# Patient Record
Sex: FEMALE | Race: WHITE | NOT HISPANIC OR LATINO | URBAN - METROPOLITAN AREA
[De-identification: names, ages, dates, MRNs, and addresses within clinical notes are randomized per-mention and may not be internally consistent; named-entity substitution may affect disease eponyms.]

---

## 2018-03-26 ENCOUNTER — INPATIENT (INPATIENT)
Facility: HOSPITAL | Age: 83
LOS: 3 days | Discharge: NURSING, REHAB OR LONG-TERM CARE | DRG: 689 | End: 2018-03-30
Attending: INTERNAL MEDICINE | Admitting: HOSPITALIST
Payer: MEDICARE

## 2018-03-26 VITALS
SYSTOLIC BLOOD PRESSURE: 141 MMHG | WEIGHT: 169.98 LBS | RESPIRATION RATE: 18 BRPM | HEART RATE: 67 BPM | OXYGEN SATURATION: 97 % | DIASTOLIC BLOOD PRESSURE: 95 MMHG | HEIGHT: 68 IN

## 2018-03-26 PROCEDURE — 93010 ELECTROCARDIOGRAM REPORT: CPT

## 2018-03-26 PROCEDURE — 99285 EMERGENCY DEPT VISIT HI MDM: CPT

## 2018-03-26 NOTE — ED PROVIDER NOTE - OBJECTIVE STATEMENT
88 y/o female presents to the ED via [facility] for evaluation of AMS. EMS states per [facility] pt normally ambulates with a walker and is verbal. Minimally verbal in the ED. Unable to obtain HPI secondary to AMS.

## 2018-03-27 DIAGNOSIS — N30.00 ACUTE CYSTITIS WITHOUT HEMATURIA: ICD-10-CM

## 2018-03-27 DIAGNOSIS — G30.1 ALZHEIMER'S DISEASE WITH LATE ONSET: ICD-10-CM

## 2018-03-27 DIAGNOSIS — R41.82 ALTERED MENTAL STATUS, UNSPECIFIED: ICD-10-CM

## 2018-03-27 DIAGNOSIS — I72.9 ANEURYSM OF UNSPECIFIED SITE: ICD-10-CM

## 2018-03-27 DIAGNOSIS — I10 ESSENTIAL (PRIMARY) HYPERTENSION: ICD-10-CM

## 2018-03-27 DIAGNOSIS — G93.40 ENCEPHALOPATHY, UNSPECIFIED: ICD-10-CM

## 2018-03-27 LAB
ALBUMIN SERPL ELPH-MCNC: 4.4 G/DL — SIGNIFICANT CHANGE UP (ref 3.3–5.2)
ALLERGY+IMMUNOLOGY DIAG STUDY NOTE: SIGNIFICANT CHANGE UP
ALP SERPL-CCNC: 80 U/L — SIGNIFICANT CHANGE UP (ref 40–120)
ALT FLD-CCNC: 21 U/L — SIGNIFICANT CHANGE UP
ANION GAP SERPL CALC-SCNC: 13 MMOL/L — SIGNIFICANT CHANGE UP (ref 5–17)
APAP SERPL-MCNC: <7.5 UG/ML — LOW (ref 10–26)
APPEARANCE UR: CLEAR — SIGNIFICANT CHANGE UP
APTT BLD: 38.7 SEC — HIGH (ref 27.5–37.4)
AST SERPL-CCNC: 34 U/L — HIGH
BACTERIA # UR AUTO: ABNORMAL
BASOPHILS # BLD AUTO: 0 K/UL — SIGNIFICANT CHANGE UP (ref 0–0.2)
BASOPHILS NFR BLD AUTO: 0.5 % — SIGNIFICANT CHANGE UP (ref 0–2)
BILIRUB SERPL-MCNC: 0.7 MG/DL — SIGNIFICANT CHANGE UP (ref 0.4–2)
BILIRUB UR-MCNC: ABNORMAL
BLD GP AB SCN SERPL QL: ABNORMAL
BUN SERPL-MCNC: 25 MG/DL — HIGH (ref 8–20)
CALCIUM SERPL-MCNC: 9.7 MG/DL — SIGNIFICANT CHANGE UP (ref 8.6–10.2)
CHLORIDE SERPL-SCNC: 101 MMOL/L — SIGNIFICANT CHANGE UP (ref 98–107)
CK SERPL-CCNC: 53 U/L — SIGNIFICANT CHANGE UP (ref 25–170)
CO2 SERPL-SCNC: 26 MMOL/L — SIGNIFICANT CHANGE UP (ref 22–29)
COLOR SPEC: YELLOW — SIGNIFICANT CHANGE UP
CREAT SERPL-MCNC: 1.09 MG/DL — SIGNIFICANT CHANGE UP (ref 0.5–1.3)
DIFF PNL FLD: ABNORMAL
DIR ANTIGLOB POLYSPECIFIC INTERPRETATION: SIGNIFICANT CHANGE UP
EOSINOPHIL # BLD AUTO: 0.2 K/UL — SIGNIFICANT CHANGE UP (ref 0–0.5)
EOSINOPHIL NFR BLD AUTO: 3.2 % — SIGNIFICANT CHANGE UP (ref 0–6)
EPI CELLS # UR: SIGNIFICANT CHANGE UP
GLUCOSE SERPL-MCNC: 101 MG/DL — SIGNIFICANT CHANGE UP (ref 70–115)
GLUCOSE UR QL: NEGATIVE MG/DL — SIGNIFICANT CHANGE UP
HBA1C BLD-MCNC: 5.4 % — SIGNIFICANT CHANGE UP (ref 4–5.6)
HCT VFR BLD CALC: 41.4 % — SIGNIFICANT CHANGE UP (ref 37–47)
HGB BLD-MCNC: 13.2 G/DL — SIGNIFICANT CHANGE UP (ref 12–16)
INR BLD: 1.06 RATIO — SIGNIFICANT CHANGE UP (ref 0.88–1.16)
KETONES UR-MCNC: ABNORMAL
LEUKOCYTE ESTERASE UR-ACNC: ABNORMAL
LIDOCAIN IGE QN: 7 U/L — LOW (ref 22–51)
LYMPHOCYTES # BLD AUTO: 1.3 K/UL — SIGNIFICANT CHANGE UP (ref 1–4.8)
LYMPHOCYTES # BLD AUTO: 19.8 % — LOW (ref 20–55)
MAGNESIUM SERPL-MCNC: 1.8 MG/DL — SIGNIFICANT CHANGE UP (ref 1.6–2.6)
MCHC RBC-ENTMCNC: 28.5 PG — SIGNIFICANT CHANGE UP (ref 27–31)
MCHC RBC-ENTMCNC: 31.9 G/DL — LOW (ref 32–36)
MCV RBC AUTO: 89.4 FL — SIGNIFICANT CHANGE UP (ref 81–99)
MONOCYTES # BLD AUTO: 0.9 K/UL — HIGH (ref 0–0.8)
MONOCYTES NFR BLD AUTO: 13.1 % — HIGH (ref 3–10)
NEUTROPHILS # BLD AUTO: 4.1 K/UL — SIGNIFICANT CHANGE UP (ref 1.8–8)
NEUTROPHILS NFR BLD AUTO: 62 % — SIGNIFICANT CHANGE UP (ref 37–73)
NITRITE UR-MCNC: NEGATIVE — SIGNIFICANT CHANGE UP
PH UR: 5 — SIGNIFICANT CHANGE UP (ref 5–8)
PHOSPHATE SERPL-MCNC: 4.2 MG/DL — SIGNIFICANT CHANGE UP (ref 2.4–4.7)
PLATELET # BLD AUTO: 169 K/UL — SIGNIFICANT CHANGE UP (ref 150–400)
POTASSIUM SERPL-MCNC: 4.8 MMOL/L — SIGNIFICANT CHANGE UP (ref 3.5–5.3)
POTASSIUM SERPL-SCNC: 4.8 MMOL/L — SIGNIFICANT CHANGE UP (ref 3.5–5.3)
PROT SERPL-MCNC: 7.4 G/DL — SIGNIFICANT CHANGE UP (ref 6.6–8.7)
PROT UR-MCNC: 30 MG/DL
PROTHROM AB SERPL-ACNC: 11.7 SEC — SIGNIFICANT CHANGE UP (ref 9.8–12.7)
RBC # BLD: 4.63 M/UL — SIGNIFICANT CHANGE UP (ref 4.4–5.2)
RBC # FLD: 15.6 % — SIGNIFICANT CHANGE UP (ref 11–15.6)
RBC CASTS # UR COMP ASSIST: ABNORMAL /HPF (ref 0–4)
SALICYLATES SERPL-MCNC: 0.6 MG/DL — LOW (ref 10–20)
SODIUM SERPL-SCNC: 140 MMOL/L — SIGNIFICANT CHANGE UP (ref 135–145)
SP GR SPEC: 1.02 — SIGNIFICANT CHANGE UP (ref 1.01–1.02)
TSH SERPL-MCNC: 3.12 UIU/ML — SIGNIFICANT CHANGE UP (ref 0.27–4.2)
TYPE + AB SCN PNL BLD: SIGNIFICANT CHANGE UP
UROBILINOGEN FLD QL: 4 MG/DL
WBC # BLD: 6.6 K/UL — SIGNIFICANT CHANGE UP (ref 4.8–10.8)
WBC # FLD AUTO: 6.6 K/UL — SIGNIFICANT CHANGE UP (ref 4.8–10.8)
WBC UR QL: ABNORMAL

## 2018-03-27 PROCEDURE — 71045 X-RAY EXAM CHEST 1 VIEW: CPT | Mod: 26

## 2018-03-27 PROCEDURE — 86077 PHYS BLOOD BANK SERV XMATCH: CPT

## 2018-03-27 PROCEDURE — 12345: CPT | Mod: NC

## 2018-03-27 PROCEDURE — 93306 TTE W/DOPPLER COMPLETE: CPT | Mod: 26

## 2018-03-27 PROCEDURE — 99223 1ST HOSP IP/OBS HIGH 75: CPT

## 2018-03-27 PROCEDURE — 70450 CT HEAD/BRAIN W/O DYE: CPT | Mod: 26

## 2018-03-27 RX ORDER — SENNA PLUS 8.6 MG/1
2 TABLET ORAL AT BEDTIME
Qty: 0 | Refills: 0 | Status: DISCONTINUED | OUTPATIENT
Start: 2018-03-27 | End: 2018-03-30

## 2018-03-27 RX ORDER — HYDRALAZINE HCL 50 MG
10 TABLET ORAL EVERY 6 HOURS
Qty: 0 | Refills: 0 | Status: DISCONTINUED | OUTPATIENT
Start: 2018-03-27 | End: 2018-03-30

## 2018-03-27 RX ORDER — DOCUSATE SODIUM 100 MG
100 CAPSULE ORAL THREE TIMES A DAY
Qty: 0 | Refills: 0 | Status: DISCONTINUED | OUTPATIENT
Start: 2018-03-27 | End: 2018-03-30

## 2018-03-27 RX ORDER — CEFTRIAXONE 500 MG/1
1000 INJECTION, POWDER, FOR SOLUTION INTRAMUSCULAR; INTRAVENOUS ONCE
Qty: 0 | Refills: 0 | Status: COMPLETED | OUTPATIENT
Start: 2018-03-27 | End: 2018-03-27

## 2018-03-27 RX ORDER — CEFTRIAXONE 500 MG/1
1 INJECTION, POWDER, FOR SOLUTION INTRAMUSCULAR; INTRAVENOUS ONCE
Qty: 0 | Refills: 0 | Status: DISCONTINUED | OUTPATIENT
Start: 2018-03-27 | End: 2018-03-27

## 2018-03-27 RX ORDER — METOPROLOL TARTRATE 50 MG
25 TABLET ORAL
Qty: 0 | Refills: 0 | Status: DISCONTINUED | OUTPATIENT
Start: 2018-03-27 | End: 2018-03-28

## 2018-03-27 RX ORDER — ASPIRIN/CALCIUM CARB/MAGNESIUM 324 MG
81 TABLET ORAL DAILY
Qty: 0 | Refills: 0 | Status: DISCONTINUED | OUTPATIENT
Start: 2018-03-27 | End: 2018-03-30

## 2018-03-27 RX ORDER — VALPROIC ACID (AS SODIUM SALT) 250 MG/5ML
250 SOLUTION, ORAL ORAL THREE TIMES A DAY
Qty: 0 | Refills: 0 | Status: DISCONTINUED | OUTPATIENT
Start: 2018-03-27 | End: 2018-03-27

## 2018-03-27 RX ORDER — ENOXAPARIN SODIUM 100 MG/ML
40 INJECTION SUBCUTANEOUS DAILY
Qty: 0 | Refills: 0 | Status: DISCONTINUED | OUTPATIENT
Start: 2018-03-27 | End: 2018-03-28

## 2018-03-27 RX ORDER — FLUOXETINE HCL 10 MG
40 CAPSULE ORAL DAILY
Qty: 0 | Refills: 0 | Status: DISCONTINUED | OUTPATIENT
Start: 2018-03-27 | End: 2018-03-30

## 2018-03-27 RX ORDER — CEFTRIAXONE 500 MG/1
1 INJECTION, POWDER, FOR SOLUTION INTRAMUSCULAR; INTRAVENOUS EVERY 24 HOURS
Qty: 0 | Refills: 0 | Status: DISCONTINUED | OUTPATIENT
Start: 2018-03-27 | End: 2018-03-27

## 2018-03-27 RX ORDER — SODIUM CHLORIDE 9 MG/ML
1000 INJECTION INTRAMUSCULAR; INTRAVENOUS; SUBCUTANEOUS ONCE
Qty: 0 | Refills: 0 | Status: COMPLETED | OUTPATIENT
Start: 2018-03-27 | End: 2018-03-27

## 2018-03-27 RX ORDER — CEFTRIAXONE 500 MG/1
1000 INJECTION, POWDER, FOR SOLUTION INTRAMUSCULAR; INTRAVENOUS EVERY 24 HOURS
Qty: 0 | Refills: 0 | Status: DISCONTINUED | OUTPATIENT
Start: 2018-03-27 | End: 2018-03-30

## 2018-03-27 RX ORDER — THIAMINE MONONITRATE (VIT B1) 100 MG
100 TABLET ORAL DAILY
Qty: 0 | Refills: 0 | Status: DISCONTINUED | OUTPATIENT
Start: 2018-03-27 | End: 2018-03-30

## 2018-03-27 RX ORDER — ASPIRIN/CALCIUM CARB/MAGNESIUM 324 MG
325 TABLET ORAL DAILY
Qty: 0 | Refills: 0 | Status: DISCONTINUED | OUTPATIENT
Start: 2018-03-27 | End: 2018-03-27

## 2018-03-27 RX ORDER — FOLIC ACID 0.8 MG
1 TABLET ORAL DAILY
Qty: 0 | Refills: 0 | Status: DISCONTINUED | OUTPATIENT
Start: 2018-03-27 | End: 2018-03-30

## 2018-03-27 RX ADMIN — Medication 10 MILLIGRAM(S): at 08:21

## 2018-03-27 RX ADMIN — Medication 10 MILLIGRAM(S): at 18:47

## 2018-03-27 RX ADMIN — Medication 81 MILLIGRAM(S): at 11:54

## 2018-03-27 RX ADMIN — Medication 100 MILLIGRAM(S): at 11:54

## 2018-03-27 RX ADMIN — Medication 40 MILLIGRAM(S): at 11:55

## 2018-03-27 RX ADMIN — ENOXAPARIN SODIUM 40 MILLIGRAM(S): 100 INJECTION SUBCUTANEOUS at 12:02

## 2018-03-27 RX ADMIN — Medication 1 MILLIGRAM(S): at 12:03

## 2018-03-27 RX ADMIN — CEFTRIAXONE 1000 MILLIGRAM(S): 500 INJECTION, POWDER, FOR SOLUTION INTRAMUSCULAR; INTRAVENOUS at 03:58

## 2018-03-27 RX ADMIN — Medication 25 MILLIGRAM(S): at 06:20

## 2018-03-27 RX ADMIN — SODIUM CHLORIDE 2000 MILLILITER(S): 9 INJECTION INTRAMUSCULAR; INTRAVENOUS; SUBCUTANEOUS at 03:57

## 2018-03-27 NOTE — CONSULT NOTE ADULT - SUBJECTIVE AND OBJECTIVE BOX
CHIEF COMPLAINT:    HPI: 89yFemale  89 y/o female came to the ED from [facility] for evaluation of AMS. As per EMS and per [facility] pt normally ambulates with a walker and is verbal. pt. is not answering any questions in the ER. Unable to obtain HPI as pt. not communicating. Clinically pt. resting in bed, awake, has eye contact but not giving any history or complaining of anything. As per [facility] pt. is not on valproic acid anymore. she is on fluoxetine now.     PAST MEDICAL & SURGICAL HISTORY:  Depression  HTN (hypertension)  Aneurysm  No significant past surgical history    MEDICATIONS  (STANDING):  aspirin enteric coated 81 milliGRAM(s) Oral daily  cefTRIAXone Injectable. 1000 milliGRAM(s) IV Push every 24 hours  enoxaparin Injectable 40 milliGRAM(s) SubCutaneous daily  FLUoxetine Solution 40 milliGRAM(s) Oral daily  folic acid 1 milliGRAM(s) Oral daily  metoprolol tartrate 25 milliGRAM(s) Oral two times a day  thiamine 100 milliGRAM(s) Oral daily    MEDICATIONS  (PRN):  docusate sodium 100 milliGRAM(s) Oral three times a day PRN Constipation  hydrALAZINE Injectable 10 milliGRAM(s) IV Push every 6 hours PRN systolic bp greater than 160 mm/hg  senna 2 Tablet(s) Oral at bedtime PRN Constipation    Allergies    No Known Allergies    Intolerances        FAMILY HISTORY:  No pertinent family history in first degree relatives          SOCIAL HISTORY:    Tobacco:  no  Alcohol:  no  Drugs:  no        REVIEW OF SYSTEMS:    Relevant systems are negative except as noted in the chart, HPI, and PMH      VITAL SIGNS:  Vital Signs Last 24 Hrs  T(C): 36.3 (27 Mar 2018 10:07), Max: 36.7 (27 Mar 2018 05:45)  T(F): 97.4 (27 Mar 2018 10:07), Max: 98.1 (27 Mar 2018 05:45)  HR: 65 (27 Mar 2018 10:07) (60 - 68)  BP: 142/63 (27 Mar 2018 10:07) (141/95 - 172/107)  BP(mean): --  RR: 18 (27 Mar 2018 10:07) (18 - 20)  SpO2: 96% (27 Mar 2018 08:22) (94% - 97%)    PHYSICAL EXAMINATION:    General: Well-developed, well nourished, in no acute distress.  Cardiac:  Regular rate and rhythm. No carotid bruits appreciated.  Eyes: Fundoscopic examination was deferred.  Neurologic:  - Mental Status:  Alert, awake, oriented to person, place, and time; Speech is fluent. Language is normal. Follows commands well.  Insight and knowledge appear appropriate.  Cranial Nerves II-XII:    II:  Visual acuity is normal for age ; Visual fields are full to confrontation; Pupils are equal, round, and reactive to light.  III, IV, VI:  Extraocular movements are intact without nystagmus.  V:  Facial sensation is intact in the V1-V3 distribution bilaterally.  VII:  Face is symmetric with normal eye closure and smile  VIII:  Hearing is grossly intact  IX, X, XII:  speech is clear  XI:  Head turning and shoulder shrug are intact.  - Motor:  Strength is 5/5 x 4.   There is no pronator drift. .  - Reflexes:  2+ and symmetric at the knees.  Plantar responses flexor.  - Sensory:  Symmetric to light touch  - Coordination:  Finger-nose-finger is normal. Rapid alternating hand and foot  movements are intact. Dexterity appears normal      LABS:                          13.2   6.6   )-----------( 169      ( 27 Mar 2018 00:52 )             41.4     27 Mar 2018 00:52    140    |  101    |  25.0   ----------------------------<  101    4.8     |  26.0   |  1.09     Ca    9.7        27 Mar 2018 00:52  Phos  4.2       27 Mar 2018 00:52  Mg     1.8       27 Mar 2018 00:52    TPro  7.4    /  Alb  4.4    /  TBili  0.7    /  DBili  x      /  AST  34     /  ALT  21     /  AlkPhos  80     27 Mar 2018 00:52    LIVER FUNCTIONS - ( 27 Mar 2018 00:52 )  Alb: 4.4 g/dL / Pro: 7.4 g/dL / ALK PHOS: 80 U/L / ALT: 21 U/L / AST: 34 U/L / GGT: x           PT/INR - ( 27 Mar 2018 00:52 )   PT: 11.7 sec;   INR: 1.06 ratio         PTT - ( 27 Mar 2018 00:52 )  PTT:38.7 sec      RADIOLOGY & ADDITIONAL STUDIES:    < from: CT Head No Cont (03.27.18 @ 00:42) >  No acute intracranial hemorrhage,large cortical infarct or mass effect.   Small lucency in the ventral midline medulla, which may be due to   artifact although additional differential diagnosis includes syrinx and   age-indeterminate infarct. A 0.6 x 0.5 cm round density just anterior to   the medulla and just left lateral to the left vertebral artery, which may   represent a focal aneurysm.    < end of copied text >      IMPRESSION:      PLAN:  1.   2.   3.   4.  5. CHIEF COMPLAINT:    HPI: 89yFemale  88 y/o female came to the ED from [facility] for evaluation of AMS. As per EMS and per [facility] pt normally ambulates with a walker and is verbal. pt. is not answering any questions in the ER. Unable to obtain HPI as pt. not communicating. Clinically pt. resting in bed, awake, has eye contact but not giving any history or complaining of anything. As per [facility] pt. is not on valproic acid anymore. she is on fluoxetine now.     This morning she is back to herself. Discussed with daughter     PAST MEDICAL & SURGICAL HISTORY:  Depression  HTN (hypertension)  Aneurysm-abdo  No significant past surgical history    MEDICATIONS  (STANDING):  aspirin enteric coated 81 milliGRAM(s) Oral daily  cefTRIAXone Injectable. 1000 milliGRAM(s) IV Push every 24 hours  enoxaparin Injectable 40 milliGRAM(s) SubCutaneous daily  FLUoxetine Solution 40 milliGRAM(s) Oral daily  folic acid 1 milliGRAM(s) Oral daily  metoprolol tartrate 25 milliGRAM(s) Oral two times a day  thiamine 100 milliGRAM(s) Oral daily    MEDICATIONS  (PRN):  docusate sodium 100 milliGRAM(s) Oral three times a day PRN Constipation  hydrALAZINE Injectable 10 milliGRAM(s) IV Push every 6 hours PRN systolic bp greater than 160 mm/hg  senna 2 Tablet(s) Oral at bedtime PRN Constipation    Allergies    No Known Allergies    Intolerances        FAMILY HISTORY:  No pertinent family history in first degree relatives          SOCIAL HISTORY:    Tobacco:  no  Alcohol:  no  Drugs:  no        REVIEW OF SYSTEMS:    Relevant systems are negative except as noted in the chart, HPI, and PMH      VITAL SIGNS:  Vital Signs Last 24 Hrs  T(C): 36.3 (27 Mar 2018 10:07), Max: 36.7 (27 Mar 2018 05:45)  T(F): 97.4 (27 Mar 2018 10:07), Max: 98.1 (27 Mar 2018 05:45)  HR: 65 (27 Mar 2018 10:07) (60 - 68)  BP: 142/63 (27 Mar 2018 10:07) (141/95 - 172/107)  BP(mean): --  RR: 18 (27 Mar 2018 10:07) (18 - 20)  SpO2: 96% (27 Mar 2018 08:22) (94% - 97%)    PHYSICAL EXAMINATION:    General: Well-developed, well nourished, in no acute distress.  Cardiac:  Regular rate and rhythm. No carotid bruits appreciated.  Eyes: Fundoscopic examination was deferred.  Neurologic:  - Mental Status:  Alert, awake, oriented to person, ; Speech is fluent. Language is normal. Follows commands well.  Cranial Nerves II-XII:    II:  Visual acuity is normal for age ; Visual fields are full to confrontation; Pupils are equal, round, and reactive to light.  III, IV, VI:  Extraocular movements are intact without nystagmus.  V:  Facial sensation is intact in the V1-V3 distribution bilaterally.  VII:  Face is symmetric with normal eye closure and smile  VIII:  Hearing is grossly intact  IX, X, XII:  speech is clear  XI:  Head turning and shoulder shrug are intact.  - Motor:  Strength is 5/5 x 4.   There is no pronator drift. .  - Reflexes:  2+ and symmetric at the knees.  Plantar responses flexor.  - Sensory:  Symmetric to light touch  - Coordination:  Finger-nose-finger is normal. Rapid alternating hand and foot  movements are intact. Dexterity appears normal      LABS:                          13.2   6.6   )-----------( 169      ( 27 Mar 2018 00:52 )             41.4     27 Mar 2018 00:52    140    |  101    |  25.0   ----------------------------<  101    4.8     |  26.0   |  1.09     Ca    9.7        27 Mar 2018 00:52  Phos  4.2       27 Mar 2018 00:52  Mg     1.8       27 Mar 2018 00:52    TPro  7.4    /  Alb  4.4    /  TBili  0.7    /  DBili  x      /  AST  34     /  ALT  21     /  AlkPhos  80     27 Mar 2018 00:52    LIVER FUNCTIONS - ( 27 Mar 2018 00:52 )  Alb: 4.4 g/dL / Pro: 7.4 g/dL / ALK PHOS: 80 U/L / ALT: 21 U/L / AST: 34 U/L / GGT: x           PT/INR - ( 27 Mar 2018 00:52 )   PT: 11.7 sec;   INR: 1.06 ratio         PTT - ( 27 Mar 2018 00:52 )  PTT:38.7 sec      RADIOLOGY & ADDITIONAL STUDIES:    < from: CT Head No Cont (03.27.18 @ 00:42) >  No acute intracranial hemorrhage,large cortical infarct or mass effect.   Small lucency in the ventral midline medulla, which may be due to   artifact although additional differential diagnosis includes syrinx and   age-indeterminate infarct. A 0.6 x 0.5 cm round density just anterior to   the medulla and just left lateral to the left vertebral artery, which may   represent a focal aneurysm.    < end of copied text >      IMPRESSION:    Dementia  Encephalopathy- urosepsis  No evidence of active primary CNS event  Daughter is comfortable with conservative management. Will defer CTA, MRI etc      PLAN:  1. Will not actively follow.   Neurologically cleared for discharge/disposition.  Please recontact as needed.    2.   3.   4.  5.

## 2018-03-27 NOTE — H&P ADULT - NSHPLABSRESULTS_GEN_ALL_CORE
cxr reviewed by me, no acute process noted. cxr reviewed by me, no acute process noted.  ekg is atrial flutter 64, lvh. ekg from 2016 is atrial flutter as well.

## 2018-03-27 NOTE — SWALLOW BEDSIDE ASSESSMENT ADULT - SWALLOW EVAL: DIAGNOSIS
Functional oral & pharyngeal stage of swallow for limited PO accepted, with NO overt s/s of aspiration

## 2018-03-27 NOTE — ED ADULT NURSE NOTE - OBJECTIVE STATEMENT
Pt received in CDU9-L AMS from [facility] sent to [facility] for possible UTI workup. Pt is responsive to verbal stimuli but speaks very few words and requires multiple stimuli to answer questions with 1 or 2 words. Pt appears to be in NAD, lungs are CTA, skin is warm dry intact and resilient, PERRL, Pt speaks coherently when prompted, and patient able to MAEx4 with equal strength and purpose. Pt with hx of dementia/depression.

## 2018-03-27 NOTE — SWALLOW BEDSIDE ASSESSMENT ADULT - COMMENTS
89 year old female dementia, markedly dilated aortic root, uti  Pt from the [facility]: upon review of transferring documentation: unable to locate diet PTA

## 2018-03-27 NOTE — H&P ADULT - HISTORY OF PRESENT ILLNESS
89 y/o female came to the ED from [facility] for evaluation of AMS. As per EMS and per [facility] pt normally ambulates with a walker and is verbal. pt. is not answering any questions in the ER. Unable to obtain HPI as pt. not communicating. Clinically pt. resting in bed, awake, has eye contact but not giving any history or complaining of anything. 89 y/o female came to the ED from [facility] for evaluation of AMS. As per EMS and per [facility] pt normally ambulates with a walker and is verbal. pt. is not answering any questions in the ER. Unable to obtain HPI as pt. not communicating. Clinically pt. resting in bed, awake, has eye contact but not giving any history or complaining of anything. As per [facility] pt. is not on valproic acid anymore. she is on fluoxetine now.

## 2018-03-27 NOTE — SWALLOW BEDSIDE ASSESSMENT ADULT - SLP GENERAL OBSERVATIONS
Pt received & seen seated upright in bed, +awake/alert, +poor cognition with behavioral overlay, thus limiting overall assessment (limited acceptance of PO)

## 2018-03-27 NOTE — H&P ADULT - ASSESSMENT
pt. is admitted for     Altered mental status, unspecified. cva ? ct head likely with no acute findings.  h/o aneurysm ? will request for neurologist  dr. jones group opinion and consult.  continue aspirin. lovenox for dvt prophylaxis if ok with neurologist, cannot get ct angio head at this point  for better evaluation of aneurysm as pt.  cannot sign for concent.     UTI, unspecified site, no hematuria. will keep on rocephin, ams might be related to uti. follow urine culture.     Essential htn, continue lopressor.     Depression , unspecified continue fluoxetine. pt. is admitted for     Altered mental status, unspecified. cva ? ct head likely with no acute findings.  h/o aneurysm ? will request for neurologist  dr. jones group opinion and consult.  continue aspirin. lovenox for dvt prophylaxis if ok with neurologist, cannot get ct angio head at this point  for better evaluation of aneurysm as pt.  cannot sign for conscent.     UTI, unspecified site, no hematuria. will keep on rocephin, ams might be related to uti. follow urine culture.     Essential htn, continue lopressor.     Depression , unspecified continue fluoxetine. pt. is admitted for     Altered mental status, unspecified. cva ? ct head likely with no acute findings.  h/o aneurysm ? will request for neurologist  dr. jones group opinion and consult.  continue aspirin. lovenox for dvt prophylaxis if ok with neurologist, cannot get ct angio head at this point  for better evaluation of aneurysm as pt.  cannot sign for conscent.     Atrial flutter, rate control, old ekg has same findings. pt. no on AC, likely  due to fall risk.  will request day team to call [facility] and discuss that and her fall risk status.    UTI, unspecified site, no hematuria. will keep on rocephin, ams might be related to uti. follow urine culture.     Essential htn, continue lopressor.          Depression , unspecified continue fluoxetine. pt. is admitted for     Altered mental status, unspecified. cva ? ct head likely with no acute findings.  h/o aneurysm ? will request for neurologist  dr. jones group opinion and consult.  continue aspirin. lovenox for dvt prophylaxis if ok with neurologist, cannot get ct angio head at this point  for better evaluation of aneurysm as pt.  cannot sign for conscent.     Atrial flutter, rate control, old ekg has same findings. pt. no on AC, likely  due to fall risk.  will request day team to call [facility] and discuss that and her fall risk status and consider anticoagulation accordingly.     UTI, unspecified site, no hematuria. will keep on rocephin, ams might be related to uti. follow urine culture.     Essential htn, continue lopressor.          Depression , unspecified continue fluoxetine.

## 2018-03-27 NOTE — H&P ADULT - NSHPPHYSICALEXAM_GEN_ALL_CORE
General: An elderly  female lying in bed , not in distress.   HEENT: AT, NC. PERRL. intact EOM.   Neck: supple. no JVD.   Chest: CTA bilaterally  Heart: normal S1,S2. RRR.   Abdomen: soft . non-distended. + pt. is not in pain on abd. palpation.   Ext: no C/C/E. noted to move all ext.   Neuro: Alert, awake, has eye contact, . noted to move ext. on her own. non focal. limited exam as pt. not following commands and not communicating.   Skin: no obvious rash noted. no warmth. no cyanosis. General: An elderly  female lying in bed , not in distress.   HEENT: AT, NC. PERRL. intact EOM.   Neck: supple. no JVD.   Chest: CTA bilaterally  Heart: normal S1,S2. RRR.   Abdomen: soft . non-distended. + pt. is not in pain on abd. palpation.   Ext: no C/C/E. noted to move all ext.   Neuro: Alert, awake, has eye contact, . noted to move ext. on her own. non focal. limited exam as pt. not following commands and not communicating.   Skin: no obvious rash noted. no warmth. no cyanosis.  Psychiatric : flat affect, does not communicate. no agitation noted.

## 2018-03-27 NOTE — SWALLOW BEDSIDE ASSESSMENT ADULT - ASR SWALLOW ASPIRATION MONITOR
fever/change of breathing pattern/position upright (89Y)/cough/gurgly voice/pneumonia/throat clearing/upper respiratory infection/oral hygiene

## 2018-03-27 NOTE — PROGRESS NOTE ADULT - SUBJECTIVE AND OBJECTIVE BOX
MATHEUS OROZCO     Chief Complaint: Patient is a 89y old  Female who presents with a chief complaint of AMS (27 Mar 2018 04:20)      PAST MEDICAL & SURGICAL HISTORY:  Depression  HTN (hypertension)  Aneurysm  No significant past surgical history      HPI/OVERNIGHT EVENTS: Patient lying in bed. She is back to her baseline as per her daughter.    MEDICATIONS  (STANDING):  aspirin enteric coated 81 milliGRAM(s) Oral daily  cefTRIAXone Injectable. 1000 milliGRAM(s) IV Push every 24 hours  enoxaparin Injectable 40 milliGRAM(s) SubCutaneous daily  FLUoxetine Solution 40 milliGRAM(s) Oral daily  folic acid 1 milliGRAM(s) Oral daily  metoprolol tartrate 25 milliGRAM(s) Oral two times a day  thiamine 100 milliGRAM(s) Oral daily      Vital Signs Last 24 Hrs  T(C): 36.3 (27 Mar 2018 10:07), Max: 36.7 (27 Mar 2018 05:45)  T(F): 97.4 (27 Mar 2018 10:07), Max: 98.1 (27 Mar 2018 05:45)  HR: 65 (27 Mar 2018 10:07) (60 - 68)  BP: 142/63 (27 Mar 2018 10:07) (141/95 - 172/107)  BP(mean): --  RR: 18 (27 Mar 2018 10:07) (18 - 20)  SpO2: 96% (27 Mar 2018 08:22) (94% - 97%)    PHYSICAL EXAM:  Constitutional:  frail elderly female  HEENT: PERRLA, EOMI, Normal Hearing, MMM  Neck: No LAD, No JVD  Back: Normal spine flexure, No CVA tenderness  Respiratory: CTAB Cardiovascular: S1 and S2, RRR, no M/G/R  Gastrointestinal: BS+, soft, NT/ND  Extremities: No peripheral edema  Vascular: 2+ peripheral pulses  Neurological: difficulty finding words    CAPILLARY BLOOD GLUCOSE    LABS:                        13.2   6.6   )-----------( 169      ( 27 Mar 2018 00:52 )             41.4     03-27    140  |  101  |  25.0<H>  ----------------------------<  101  4.8   |  26.0  |  1.09    Ca    9.7      27 Mar 2018 00:52  Phos  4.2     03-27  Mg     1.8         TPro  7.4  /  Alb  4.4  /  TBili  0.7  /  DBili  x   /  AST  34<H>  /  ALT  21  /  AlkPhos  80      PT/INR - ( 27 Mar 2018 00:52 )   PT: 11.7 sec;   INR: 1.06 ratio         PTT - ( 27 Mar 2018 00:52 )  PTT:38.7 sec  Urinalysis Basic - ( 27 Mar 2018 02:27 )    Color: Yellow / Appearance: Clear / S.025 / pH: x  Gluc: x / Ketone: Trace  / Bili: Small / Urobili: 4 mg/dL   Blood: x / Protein: 30 mg/dL / Nitrite: Negative   Leuk Esterase: Moderate / RBC: 3-5 /HPF / WBC 6-10   Sq Epi: x / Non Sq Epi: Few / Bacteria: Occasional        RADIOLOGY & ADDITIONAL TESTS: MATHEUS OROZCO     Chief Complaint: Patient is a 89y old  Female who presents with a chief complaint of AMS (27 Mar 2018 04:20)      PAST MEDICAL & SURGICAL HISTORY:  Depression  HTN (hypertension)  Aneurysm  No significant past surgical history      HPI/OVERNIGHT EVENTS: Patient lying in bed. She is back to her baseline as per her daughter. I spoke to Dr Brown regarding markedly elevated aortic root. I spoke to daughter who is aware and has elected no intervention at this time, she is aware of the very high risk of aortic dissection.  MEDICATIONS  (STANDING):  aspirin enteric coated 81 milliGRAM(s) Oral daily  cefTRIAXone Injectable. 1000 milliGRAM(s) IV Push every 24 hours  enoxaparin Injectable 40 milliGRAM(s) SubCutaneous daily  FLUoxetine Solution 40 milliGRAM(s) Oral daily  folic acid 1 milliGRAM(s) Oral daily  metoprolol tartrate 25 milliGRAM(s) Oral two times a day  thiamine 100 milliGRAM(s) Oral daily      Vital Signs Last 24 Hrs  T(C): 36.3 (27 Mar 2018 10:07), Max: 36.7 (27 Mar 2018 05:45)  T(F): 97.4 (27 Mar 2018 10:07), Max: 98.1 (27 Mar 2018 05:45)  HR: 65 (27 Mar 2018 10:07) (60 - 68)  BP: 142/63 (27 Mar 2018 10:07) (141/95 - 172/107)  BP(mean): --  RR: 18 (27 Mar 2018 10:07) (18 - 20)  SpO2: 96% (27 Mar 2018 08:22) (94% - 97%)    PHYSICAL EXAM:  Constitutional:  frail elderly female  HEENT: PERRLA, EOMI, Normal Hearing, MMM  Neck: No LAD, No JVD  Back: Normal spine flexure, No CVA tenderness  Respiratory: CTAB Cardiovascular: S1 and S2, RRR, no M/G/R  Gastrointestinal: BS+, soft, NT/ND  Extremities: No peripheral edema  Vascular: 2+ peripheral pulses  Neurological: difficulty finding words    CAPILLARY BLOOD GLUCOSE    LABS:                        13.2   6.6   )-----------( 169      ( 27 Mar 2018 00:52 )             41.4     03-27    140  |  101  |  25.0<H>  ----------------------------<  101  4.8   |  26.0  |  1.09    Ca    9.7      27 Mar 2018 00:52  Phos  4.2       Mg     1.8         TPro  7.4  /  Alb  4.4  /  TBili  0.7  /  DBili  x   /  AST  34<H>  /  ALT  21  /  AlkPhos  80      PT/INR - ( 27 Mar 2018 00:52 )   PT: 11.7 sec;   INR: 1.06 ratio         PTT - ( 27 Mar 2018 00:52 )  PTT:38.7 sec  Urinalysis Basic - ( 27 Mar 2018 02:27 )    Color: Yellow / Appearance: Clear / S.025 / pH: x  Gluc: x / Ketone: Trace  / Bili: Small / Urobili: 4 mg/dL   Blood: x / Protein: 30 mg/dL / Nitrite: Negative   Leuk Esterase: Moderate / RBC: 3-5 /HPF / WBC 6-10   Sq Epi: x / Non Sq Epi: Few / Bacteria: Occasional        RADIOLOGY & ADDITIONAL TESTS:

## 2018-03-28 LAB
CHOLEST SERPL-MCNC: 194 MG/DL — SIGNIFICANT CHANGE UP (ref 110–199)
HDLC SERPL-MCNC: 60 MG/DL — LOW
LIPID PNL WITH DIRECT LDL SERPL: 116 MG/DL — SIGNIFICANT CHANGE UP
TOTAL CHOLESTEROL/HDL RATIO MEASUREMENT: 3 RATIO — LOW (ref 3.3–7.1)
TRIGL SERPL-MCNC: 89 MG/DL — SIGNIFICANT CHANGE UP (ref 10–200)

## 2018-03-28 PROCEDURE — 99233 SBSQ HOSP IP/OBS HIGH 50: CPT

## 2018-03-28 RX ORDER — METOPROLOL TARTRATE 50 MG
50 TABLET ORAL
Qty: 0 | Refills: 0 | Status: DISCONTINUED | OUTPATIENT
Start: 2018-03-28 | End: 2018-03-30

## 2018-03-28 RX ADMIN — Medication 10 MILLIGRAM(S): at 10:32

## 2018-03-28 RX ADMIN — Medication 100 MILLIGRAM(S): at 13:01

## 2018-03-28 RX ADMIN — Medication 1 MILLIGRAM(S): at 13:01

## 2018-03-28 RX ADMIN — Medication 81 MILLIGRAM(S): at 13:01

## 2018-03-28 RX ADMIN — CEFTRIAXONE 1000 MILLIGRAM(S): 500 INJECTION, POWDER, FOR SOLUTION INTRAMUSCULAR; INTRAVENOUS at 05:43

## 2018-03-28 NOTE — PHYSICAL THERAPY INITIAL EVALUATION ADULT - IMPAIRMENTS FOUND, PT EVAL
cognitive impairment/muscle strength/aerobic capacity/endurance/gait, locomotion, and balance/poor safety awareness

## 2018-03-28 NOTE — PHYSICAL THERAPY INITIAL EVALUATION ADULT - MANUAL MUSCLE TESTING RESULTS, REHAB EVAL
april. UE and LE at least 3/5 throughout; unable to perform MMT due to pt. with difficulty following command

## 2018-03-28 NOTE — PROGRESS NOTE ADULT - SUBJECTIVE AND OBJECTIVE BOX
MATHEUS OROZCO     Chief Complaint: Patient is a 89y old  Female who presents with a chief complaint of AMS (27 Mar 2018 04:20)      PAST MEDICAL & SURGICAL HISTORY:  Depression  HTN (hypertension)  Aneurysm  No significant past surgical history      HPI/OVERNIGHT EVENTS: Patient confused, refusing bp meds now with markedly elevated bp, I called a family member who refused to speak to me stating another family member is on the way.    MEDICATIONS  (STANDING):  aspirin enteric coated 81 milliGRAM(s) Oral daily  cefTRIAXone Injectable. 1000 milliGRAM(s) IV Push every 24 hours  FLUoxetine Solution 40 milliGRAM(s) Oral daily  folic acid 1 milliGRAM(s) Oral daily  metoprolol tartrate 50 milliGRAM(s) Oral two times a day  thiamine 100 milliGRAM(s) Oral daily      Vital Signs Last 24 Hrs  T(C): 36.7 (28 Mar 2018 10:06), Max: 36.9 (28 Mar 2018 05:30)  T(F): 98.1 (28 Mar 2018 10:06), Max: 98.5 (28 Mar 2018 05:30)  HR: 94 (28 Mar 2018 11:03) (67 - 94)  BP: 150/88 (28 Mar 2018 11:03) (146/100 - 180/86)  BP(mean): --  RR: 17 (28 Mar 2018 11:03) (16 - 18)  SpO2: 96% (28 Mar 2018 11:03) (93% - 97%)    PHYSICAL EXAM:  Constitutional:  confused  HEENT: PERRLA, EOMI, Normal Hearing, MMM  Neck: No LAD, No JVD  Back: Normal spine flexure, No CVA tenderness  Respiratory: CTAB Cardiovascular: S1 and S2, RRR, no M/G/R  Gastrointestinal: BS+, soft, NT/ND  Extremities: No peripheral edema  Vascular: 2+ peripheral pulses  Neurological:  dementia    CAPILLARY BLOOD GLUCOSE    LABS:                        13.2   6.6   )-----------( 169      ( 27 Mar 2018 00:52 )             41.4     03-27    140  |  101  |  25.0<H>  ----------------------------<  101  4.8   |  26.0  |  1.09    Ca    9.7      27 Mar 2018 00:52  Phos  4.2     03-27  Mg     1.8         TPro  7.4  /  Alb  4.4  /  TBili  0.7  /  DBili  x   /  AST  34<H>  /  ALT  21  /  AlkPhos  80      PT/INR - ( 27 Mar 2018 00:52 )   PT: 11.7 sec;   INR: 1.06 ratio         PTT - ( 27 Mar 2018 00:52 )  PTT:38.7 sec  Urinalysis Basic - ( 27 Mar 2018 02:27 )    Color: Yellow / Appearance: Clear / S.025 / pH: x  Gluc: x / Ketone: Trace  / Bili: Small / Urobili: 4 mg/dL   Blood: x / Protein: 30 mg/dL / Nitrite: Negative   Leuk Esterase: Moderate / RBC: 3-5 /HPF / WBC 6-10   Sq Epi: x / Non Sq Epi: Few / Bacteria: Occasional        RADIOLOGY & ADDITIONAL TESTS:

## 2018-03-28 NOTE — PHYSICAL THERAPY INITIAL EVALUATION ADULT - FOLLOWS COMMANDS/ANSWERS QUESTIONS, REHAB EVAL
pt. with difficulty following command; requires constant cueing and re-directing to stay in current task/50% of the time/able to follow single-step instructions

## 2018-03-28 NOTE — PHYSICAL THERAPY INITIAL EVALUATION ADULT - TRANSFER SAFETY CONCERNS NOTED: BED/CHAIR, REHAB EVAL
decreased safety awareness/losing balance/decreased balance during turns/decreased weight-shifting ability/decreased step length

## 2018-03-28 NOTE — PROGRESS NOTE ADULT - ASSESSMENT
89 year old female dementia, markedly dilated aortic root, uti. I had a long conversation with the daughter who is aware of this cardio thoracic emergency and has elected not to seek invasive procedures at this time due to the patient's poor functional status.

## 2018-03-28 NOTE — PHYSICAL THERAPY INITIAL EVALUATION ADULT - ADDITIONAL COMMENTS
Pt. is very confused; A&Ox1; unable to obtain history from pt. Pt. received from [facility]; PLOF unknown.

## 2018-03-28 NOTE — PHYSICAL THERAPY INITIAL EVALUATION ADULT - GAIT DEVIATIONS NOTED, PT EVAL
decreased step length/decreased linda/increased time in double stance/decreased stride length/decreased weight-shifting ability

## 2018-03-29 PROCEDURE — 99233 SBSQ HOSP IP/OBS HIGH 50: CPT

## 2018-03-29 RX ADMIN — Medication 10 MILLIGRAM(S): at 07:32

## 2018-03-29 RX ADMIN — Medication 50 MILLIGRAM(S): at 17:39

## 2018-03-29 RX ADMIN — CEFTRIAXONE 1000 MILLIGRAM(S): 500 INJECTION, POWDER, FOR SOLUTION INTRAMUSCULAR; INTRAVENOUS at 07:31

## 2018-03-29 NOTE — PROGRESS NOTE ADULT - SUBJECTIVE AND OBJECTIVE BOX
MATHEUS OROZCO     Chief Complaint: Patient is a 89y old  Female who presents with a chief complaint of AMS (27 Mar 2018 04:20)      PAST MEDICAL & SURGICAL HISTORY:  Depression  HTN (hypertension)  Aneurysm  No significant past surgical history      HPI/OVERNIGHT EVENTS: Patient in no distress    MEDICATIONS  (STANDING):  aspirin enteric coated 81 milliGRAM(s) Oral daily  cefTRIAXone Injectable. 1000 milliGRAM(s) IV Push every 24 hours  FLUoxetine Solution 40 milliGRAM(s) Oral daily  folic acid 1 milliGRAM(s) Oral daily  metoprolol tartrate 50 milliGRAM(s) Oral two times a day  thiamine 100 milliGRAM(s) Oral daily      Vital Signs Last 24 Hrs  T(C): 36.6 (29 Mar 2018 12:42), Max: 36.6 (29 Mar 2018 05:28)  T(F): 97.8 (29 Mar 2018 12:42), Max: 97.9 (29 Mar 2018 05:28)  HR: 111 (29 Mar 2018 12:42) (71 - 111)  BP: 112/82 (29 Mar 2018 12:42) (112/82 - 162/88)  BP(mean): --  RR: 19 (29 Mar 2018 12:42) (17 - 19)  SpO2: 95% (29 Mar 2018 12:42) (95% - 97%)    PHYSICAL EXAM:  Constitutional:  Patient with dementia  HEENT: PERRLA, EOMI, Normal Hearing, MMM  Neck: No LAD, No JVD  Back: Normal spine flexure, No CVA tenderness  Respiratory: CTAB Cardiovascular: S1 and S2, RRR, no M/G/R  Gastrointestinal: BS+, soft, NT/ND  Extremities: No peripheral edema  Vascular: 2+ peripheral pulses     Psychiatric: Normal mood, normal affect  Musculoskeletal: 5/5 strength b/l upper and lower extremities  Skin: No rashes    CAPILLARY BLOOD GLUCOSE    LABS:                RADIOLOGY & ADDITIONAL TESTS:

## 2018-03-29 NOTE — PROGRESS NOTE ADULT - ASSESSMENT
89 year old female dementia, markedly dilated aortic root, uti. I had a long conversation with the daughter who is aware of this cardio thoracic emergency and has elected not to seek invasive procedures at this time due to the patient's poor functional status. Transfer to rehab on 3/30.

## 2018-03-30 ENCOUNTER — TRANSCRIPTION ENCOUNTER (OUTPATIENT)
Age: 83
End: 2018-03-30

## 2018-03-30 VITALS
HEART RATE: 77 BPM | RESPIRATION RATE: 18 BRPM | TEMPERATURE: 98 F | SYSTOLIC BLOOD PRESSURE: 130 MMHG | OXYGEN SATURATION: 94 % | DIASTOLIC BLOOD PRESSURE: 88 MMHG

## 2018-03-30 PROCEDURE — 96374 THER/PROPH/DIAG INJ IV PUSH: CPT | Mod: XU

## 2018-03-30 PROCEDURE — 84100 ASSAY OF PHOSPHORUS: CPT

## 2018-03-30 PROCEDURE — 80053 COMPREHEN METABOLIC PANEL: CPT

## 2018-03-30 PROCEDURE — 85610 PROTHROMBIN TIME: CPT

## 2018-03-30 PROCEDURE — 92610 EVALUATE SWALLOWING FUNCTION: CPT

## 2018-03-30 PROCEDURE — 93306 TTE W/DOPPLER COMPLETE: CPT

## 2018-03-30 PROCEDURE — 87186 SC STD MICRODIL/AGAR DIL: CPT

## 2018-03-30 PROCEDURE — 86870 RBC ANTIBODY IDENTIFICATION: CPT

## 2018-03-30 PROCEDURE — 70450 CT HEAD/BRAIN W/O DYE: CPT

## 2018-03-30 PROCEDURE — 97163 PT EVAL HIGH COMPLEX 45 MIN: CPT

## 2018-03-30 PROCEDURE — 93005 ELECTROCARDIOGRAM TRACING: CPT

## 2018-03-30 PROCEDURE — 36415 COLL VENOUS BLD VENIPUNCTURE: CPT

## 2018-03-30 PROCEDURE — 85730 THROMBOPLASTIN TIME PARTIAL: CPT

## 2018-03-30 PROCEDURE — 86901 BLOOD TYPING SEROLOGIC RH(D): CPT

## 2018-03-30 PROCEDURE — 86880 COOMBS TEST DIRECT: CPT

## 2018-03-30 PROCEDURE — 81001 URINALYSIS AUTO W/SCOPE: CPT

## 2018-03-30 PROCEDURE — 83036 HEMOGLOBIN GLYCOSYLATED A1C: CPT

## 2018-03-30 PROCEDURE — 71045 X-RAY EXAM CHEST 1 VIEW: CPT

## 2018-03-30 PROCEDURE — 86850 RBC ANTIBODY SCREEN: CPT

## 2018-03-30 PROCEDURE — 80307 DRUG TEST PRSMV CHEM ANLYZR: CPT

## 2018-03-30 PROCEDURE — 82550 ASSAY OF CK (CPK): CPT

## 2018-03-30 PROCEDURE — 83690 ASSAY OF LIPASE: CPT

## 2018-03-30 PROCEDURE — 99285 EMERGENCY DEPT VISIT HI MDM: CPT | Mod: 25

## 2018-03-30 PROCEDURE — 84443 ASSAY THYROID STIM HORMONE: CPT

## 2018-03-30 PROCEDURE — 85027 COMPLETE CBC AUTOMATED: CPT

## 2018-03-30 PROCEDURE — 80061 LIPID PANEL: CPT

## 2018-03-30 PROCEDURE — 99233 SBSQ HOSP IP/OBS HIGH 50: CPT

## 2018-03-30 PROCEDURE — 83735 ASSAY OF MAGNESIUM: CPT

## 2018-03-30 PROCEDURE — 86900 BLOOD TYPING SEROLOGIC ABO: CPT

## 2018-03-30 PROCEDURE — 87086 URINE CULTURE/COLONY COUNT: CPT

## 2018-03-30 RX ORDER — LISINOPRIL 2.5 MG/1
2.5 TABLET ORAL DAILY
Qty: 0 | Refills: 0 | Status: DISCONTINUED | OUTPATIENT
Start: 2018-03-30 | End: 2018-03-30

## 2018-03-30 RX ORDER — METOPROLOL TARTRATE 50 MG
1 TABLET ORAL
Qty: 0 | Refills: 0 | COMMUNITY
Start: 2018-03-30

## 2018-03-30 RX ORDER — LISINOPRIL 2.5 MG/1
1 TABLET ORAL
Qty: 0 | Refills: 0 | COMMUNITY
Start: 2018-03-30

## 2018-03-30 RX ORDER — ASPIRIN/CALCIUM CARB/MAGNESIUM 324 MG
1 TABLET ORAL
Qty: 0 | Refills: 0 | COMMUNITY
Start: 2018-03-30

## 2018-03-30 RX ORDER — FLUOXETINE HCL 10 MG
10 CAPSULE ORAL
Qty: 0 | Refills: 0 | COMMUNITY
Start: 2018-03-30

## 2018-03-30 RX ADMIN — Medication 40 MILLIGRAM(S): at 14:26

## 2018-03-30 RX ADMIN — Medication 1 MILLIGRAM(S): at 14:27

## 2018-03-30 RX ADMIN — CEFTRIAXONE 1000 MILLIGRAM(S): 500 INJECTION, POWDER, FOR SOLUTION INTRAMUSCULAR; INTRAVENOUS at 06:48

## 2018-03-30 RX ADMIN — Medication 50 MILLIGRAM(S): at 18:02

## 2018-03-30 RX ADMIN — LISINOPRIL 2.5 MILLIGRAM(S): 2.5 TABLET ORAL at 14:26

## 2018-03-30 RX ADMIN — Medication 100 MILLIGRAM(S): at 14:27

## 2018-03-30 RX ADMIN — Medication 81 MILLIGRAM(S): at 14:27

## 2018-03-30 NOTE — DISCHARGE NOTE ADULT - SECONDARY DIAGNOSIS.
Acute cystitis without hematuria Encephalopathy Depression, unspecified depression type Aneurysm Late onset Alzheimer's disease without behavioral disturbance

## 2018-03-30 NOTE — DISCHARGE NOTE ADULT - HOSPITAL COURSE
89 year old female dementia, markedly dilated aortic root, uti. I had a long conversation with the daughter who is aware of this cardio thoracic emergency and has elected not to seek invasive procedures at this time due to the patient's poor functional status. Transfer to rehab on 3/30 with supportive care.     Problem/Plan - 1:  ·  Problem: Encephalopathy.  Plan: Patient has metabolic encephalopathy as well as baseline dementia. According to the daughter she is much improved from admission and near her baseline.  On 3/30 encephalopathy has resolved but she has severe advanced dementia.      Problem/Plan - 2:  ·  Problem: Late onset Alzheimer's disease without behavioral disturbance.  Plan: Supportive care.      Problem/Plan - 3:  ·  Problem: Acute cystitis without hematuria.  Plan: Cultures noted and patient has completed cycle of antibiotics.      Problem/Plan - 4:  ·  Problem: Aneurysm.  Plan: Daughter is aware of marked aortic dilation and elects for no intervention at this time. She is aware of the risk of aortic dissection and death.      Problem/Plan - 5:  ·  Problem: Essential hypertension.  Plan: Target tight blood pressure control. Add lisinopril 2.5 mg.     Attending Attestation:   I was physically present for the key portions of the evaluation and management (E/M) service provided.  I agree with the above history, physical, and plan which I have reviewed and edited where appropriate.     45 minutes spent on total encounter; more than 50% of the visit was spent counseling and/or coordinating care by the attending physician.

## 2018-03-30 NOTE — DISCHARGE NOTE ADULT - PATIENT PORTAL LINK FT
You can access the [facility], offered by [facility], by registering with the following website: [email or web address]

## 2018-03-30 NOTE — PROGRESS NOTE ADULT - PROBLEM SELECTOR PLAN 3
Cultures noted and patient has completed cycle of antibiotics.
Continue rocephin Day 2 out of three, await urine culture
Continue rocephin Day 2 out of three, await urine culture
Continue rocephin, await urine culture

## 2018-03-30 NOTE — DISCHARGE NOTE ADULT - PROVIDER TOKENS
FREE:[LAST:[[facility]],PHONE:[(   )    -],FAX:[(   )    -]] FREE:[LAST:[[facility]],PHONE:[(   )    -],FAX:[(   )    -]]

## 2018-03-30 NOTE — DISCHARGE NOTE ADULT - CARE PLAN
Principal Discharge DX:	Delirium  Goal:	Resolved  Assessment and plan of treatment:	Continue supportive care  Secondary Diagnosis:	Acute cystitis without hematuria  Goal:	Resolved  Secondary Diagnosis:	Encephalopathy  Goal:	Resolved  Secondary Diagnosis:	Depression, unspecified depression type  Goal:	Supportive care  Secondary Diagnosis:	Aneurysm  Goal:	Patient's Health Care Proxy refuses surgery. She is aware of the risk of death.  Secondary Diagnosis:	Late onset Alzheimer's disease without behavioral disturbance  Goal:	Resolved

## 2018-03-30 NOTE — PROGRESS NOTE ADULT - PROBLEM SELECTOR PROBLEM 3
Acute cystitis without hematuria

## 2018-03-30 NOTE — PROGRESS NOTE ADULT - SUBJECTIVE AND OBJECTIVE BOX
MATHEUS OROZCO     Chief Complaint: Patient is a 89y old  Female who presents with a chief complaint of AMS (27 Mar 2018 04:20)      PAST MEDICAL & SURGICAL HISTORY:  Depression  HTN (hypertension)  Aneurysm  No significant past surgical history      HPI/OVERNIGHT EVENTS:  Patient sleeping, arousable. Stable for transfer to rehab.    MEDICATIONS  (STANDING):  aspirin enteric coated 81 milliGRAM(s) Oral daily  cefTRIAXone Injectable. 1000 milliGRAM(s) IV Push every 24 hours  FLUoxetine Solution 40 milliGRAM(s) Oral daily  folic acid 1 milliGRAM(s) Oral daily  metoprolol tartrate 50 milliGRAM(s) Oral two times a day  thiamine 100 milliGRAM(s) Oral daily      Vital Signs Last 24 Hrs  T(C): 36.5 (30 Mar 2018 07:33), Max: 37 (29 Mar 2018 23:07)  T(F): 97.7 (30 Mar 2018 07:33), Max: 98.6 (29 Mar 2018 23:07)  HR: 72 (30 Mar 2018 07:33) (72 - 111)  BP: 130/92 (30 Mar 2018 07:33) (112/82 - 157/101)  BP(mean): --  RR: 18 (30 Mar 2018 07:33) (18 - 19)  SpO2: 94% (30 Mar 2018 07:33) (92% - 95%)    PHYSICAL EXAM:  Constitutional: NAD, well-groomed, well-developed  HEENT: PERRLA, EOMI, Normal Hearing, MMM  Neck: No LAD, No JVD  Back: Normal spine flexure, No CVA tenderness  Respiratory: CTAB Cardiovascular: S1 and S2, RRR, no M/G/R  Gastrointestinal: BS+, soft, NT/ND  Extremities: No peripheral edema  Vascular: 2+ peripheral pulses  Neurological: Severe advanced dementia

## 2018-03-30 NOTE — PROGRESS NOTE ADULT - ASSESSMENT
89 year old female dementia, markedly dilated aortic root, uti. I had a long conversation with the daughter who is aware of this cardio thoracic emergency and has elected not to seek invasive procedures at this time due to the patient's poor functional status. Transfer to rehab on 3/30 with supportive care.

## 2018-03-30 NOTE — PROGRESS NOTE ADULT - PROBLEM SELECTOR PROBLEM 2
Late onset Alzheimer's disease without behavioral disturbance

## 2018-03-30 NOTE — PROGRESS NOTE ADULT - PROBLEM SELECTOR PLAN 1
Patient has metabolic encephalopathy as well as baseline dementia. According to the daughter she is much improved from admission and near her baseline.  On 3/30 encephalopathy has resolved but she has severe advanced dementia.
Patient has metabolic encephalopathy as well as baseline dementia. According to the daughter she is much improved from admission and near her baseline.

## 2018-03-30 NOTE — PROGRESS NOTE ADULT - PROBLEM SELECTOR PLAN 5
Target tight blood pressure control. Add lisinopril 2.5 mg
Target tight blood pressue control.

## 2018-03-30 NOTE — DISCHARGE NOTE ADULT - CARE PROVIDER_API CALL
Malcolm,   Phone: (   )    -  Fax: (   )    - [facility] Physician,   Phone: (   )    -  Fax: (   )    -

## 2018-03-30 NOTE — DISCHARGE NOTE ADULT - MEDICATION SUMMARY - MEDICATIONS TO TAKE
I will START or STAY ON the medications listed below when I get home from the hospital:    aspirin 81 mg oral delayed release tablet  -- 1 tab(s) by mouth once a day  -- Indication: For Supplement    lisinopril 2.5 mg oral tablet  -- 1 tab(s) by mouth once a day  -- Indication: For HTN (hypertension)    valproic acid 250 mg/5 mL oral syrup  -- 5 milliliter(s) by mouth 3 times a day  -- Indication: For Seizure    FLUoxetine 20 mg/5 mL oral solution  -- 10 milliliter(s) by mouth once a day  -- Indication: For Depression    metoprolol tartrate 50 mg oral tablet  -- 1 tab(s) by mouth 2 times a day  -- Indication: For HTN (hypertension)    docusate sodium 100 mg oral capsule  -- 1 cap(s) by mouth 3 times a day, As Needed  -- Indication: For Constipation    senna oral tablet  -- 2 tab(s) by mouth once a day (at bedtime), As Needed  -- Indication: For Constipation    thiamine 100 mg oral tablet  -- 1 tab(s) by mouth once a day  -- Indication: For Supplement    folic acid 1 mg oral tablet  -- 1 tab(s) by mouth once a day  -- Indication: For Supplement

## 2018-03-30 NOTE — PROGRESS NOTE ADULT - PROBLEM SELECTOR PLAN 4
Daughter is aware of marked aortic dilation and elects for no intervention at this time. She is aware of the risk of aortic dissection and death.

## 2018-03-30 NOTE — DISCHARGE NOTE ADULT - MEDICATION SUMMARY - MEDICATIONS TO CHANGE
I will SWITCH the dose or number of times a day I take the medications listed below when I get home from the hospital:    metoprolol tartrate 25 mg oral tablet  -- 1 tab(s) by mouth 2 times a day    aspirin 325 mg oral tablet  -- 1 tab(s) by mouth once a day    aspirin 81 mg oral delayed release tablet  -- 1 tab(s) by mouth once a day

## 2018-03-30 NOTE — DISCHARGE NOTE ADULT - PLAN OF CARE
Resolved Continue supportive care Supportive care Patient's Health Care Proxy refuses surgery. She is aware of the risk of death.

## 2018-04-02 ENCOUNTER — EMERGENCY (EMERGENCY)
Facility: HOSPITAL | Age: 83
LOS: 1 days | Discharge: HOME | End: 2018-04-02
Attending: EMERGENCY MEDICINE | Admitting: EMERGENCY MEDICINE
Payer: MEDICARE

## 2018-04-02 VITALS
SYSTOLIC BLOOD PRESSURE: 134 MMHG | RESPIRATION RATE: 20 BRPM | WEIGHT: 179.9 LBS | TEMPERATURE: 98 F | HEART RATE: 72 BPM | DIASTOLIC BLOOD PRESSURE: 99 MMHG | OXYGEN SATURATION: 96 %

## 2018-04-02 PROCEDURE — 90715 TDAP VACCINE 7 YRS/> IM: CPT

## 2018-04-02 PROCEDURE — 72125 CT NECK SPINE W/O DYE: CPT | Mod: 26

## 2018-04-02 PROCEDURE — 72125 CT NECK SPINE W/O DYE: CPT

## 2018-04-02 PROCEDURE — 99284 EMERGENCY DEPT VISIT MOD MDM: CPT

## 2018-04-02 PROCEDURE — 70450 CT HEAD/BRAIN W/O DYE: CPT | Mod: 26

## 2018-04-02 PROCEDURE — 99284 EMERGENCY DEPT VISIT MOD MDM: CPT | Mod: 25

## 2018-04-02 PROCEDURE — 70450 CT HEAD/BRAIN W/O DYE: CPT

## 2018-04-02 PROCEDURE — 90471 IMMUNIZATION ADMIN: CPT

## 2018-04-02 RX ORDER — TETANUS TOXOID, REDUCED DIPHTHERIA TOXOID AND ACELLULAR PERTUSSIS VACCINE, ADSORBED 5; 2.5; 8; 8; 2.5 [IU]/.5ML; [IU]/.5ML; UG/.5ML; UG/.5ML; UG/.5ML
0.5 SUSPENSION INTRAMUSCULAR ONCE
Qty: 0 | Refills: 0 | Status: COMPLETED | OUTPATIENT
Start: 2018-04-02 | End: 2018-04-02

## 2018-04-02 RX ADMIN — TETANUS TOXOID, REDUCED DIPHTHERIA TOXOID AND ACELLULAR PERTUSSIS VACCINE, ADSORBED 0.5 MILLILITER(S): 5; 2.5; 8; 8; 2.5 SUSPENSION INTRAMUSCULAR at 23:08

## 2018-04-02 NOTE — ED PROVIDER NOTE - MEDICAL DECISION MAKING DETAILS
wound care precautions given at baseline mental status per daughter at bedsdie return to ed for intractable HA, persistent vomiting, or new onset motor/sensory deficits

## 2018-04-02 NOTE — ED ADULT NURSE NOTE - OBJECTIVE STATEMENT
Assumed pt care at 2200.  Pt awake, alert and oriented to person, disoriented to situation/place/time.  Unable to obtain accurate information at this time secondary to no family/witness present at this time.  Pt with laceration to right forehead, gauze applied, bleeding controlled at this time.  Pt easily arousable to verbal stimuli, speech clear.  Pt offering no complaints at this time.  Pt transported to CT.  Will continue to monitor and reassess.

## 2018-04-02 NOTE — ED ADULT TRIAGE NOTE - CHIEF COMPLAINT QUOTE
patient biba from [facility] after an unwitnessed fall, patient with lac above her right eye bleeding patients daughter states that she has a history of dimentia, patients daughter states that patient was on the floor for atleast 20 minutes. patient awake and alert at this time, patient on a low dose asa

## 2018-04-02 NOTE — ED PROVIDER NOTE - SKIN, MLM
Skin normal color for race and warm. No evidence of rash. Frontal 3 cm scalp laceration. Not actively bleeding.

## 2018-04-02 NOTE — ED ADULT NURSE REASSESSMENT NOTE - NS ED NURSE REASSESS COMMENT FT1
Report received from off going RN, charting as noted. Patient received pleasantly confused, as per report, this is patients baseline. Respirations even & unlabored. Will continue to monitor.

## 2018-04-02 NOTE — ED ADULT NURSE NOTE - CHIEF COMPLAINT QUOTE
patient biba from [facility] after an unwitnessed fall, patient with lac above her right eye bleeding patients daughter states that she has a history of dimentia, patients daughter states that patient was on the floor for atleast 20 minutes. patient awake and alert at this time, patient on a low dose asa

## 2018-04-03 VITALS — SYSTOLIC BLOOD PRESSURE: 138 MMHG | DIASTOLIC BLOOD PRESSURE: 100 MMHG | HEART RATE: 67 BPM | OXYGEN SATURATION: 99 %

## 2018-09-27 ENCOUNTER — INPATIENT (INPATIENT)
Facility: HOSPITAL | Age: 83
LOS: 3 days | Discharge: NURSING, REHAB OR LONG-TERM CARE | DRG: 85 | End: 2018-10-01
Attending: INTERNAL MEDICINE | Admitting: GENERAL ACUTE CARE HOSPITAL
Payer: MEDICARE

## 2018-09-27 VITALS
HEART RATE: 81 BPM | DIASTOLIC BLOOD PRESSURE: 79 MMHG | OXYGEN SATURATION: 99 % | SYSTOLIC BLOOD PRESSURE: 126 MMHG | RESPIRATION RATE: 16 BRPM | TEMPERATURE: 98 F

## 2018-09-27 LAB
ALBUMIN SERPL ELPH-MCNC: 3.9 G/DL — SIGNIFICANT CHANGE UP (ref 3.3–5.2)
ALLERGY+IMMUNOLOGY DIAG STUDY NOTE: SIGNIFICANT CHANGE UP
ALP SERPL-CCNC: 94 U/L — SIGNIFICANT CHANGE UP (ref 40–120)
ALT FLD-CCNC: 16 U/L — SIGNIFICANT CHANGE UP
ANION GAP SERPL CALC-SCNC: 13 MMOL/L — SIGNIFICANT CHANGE UP (ref 5–17)
APPEARANCE UR: CLEAR — SIGNIFICANT CHANGE UP
APTT BLD: 31.7 SEC — SIGNIFICANT CHANGE UP (ref 27.5–37.4)
AST SERPL-CCNC: 19 U/L — SIGNIFICANT CHANGE UP
BACTERIA # UR AUTO: ABNORMAL
BILIRUB SERPL-MCNC: 0.8 MG/DL — SIGNIFICANT CHANGE UP (ref 0.4–2)
BILIRUB UR-MCNC: NEGATIVE — SIGNIFICANT CHANGE UP
BLD GP AB SCN SERPL QL: ABNORMAL
BUN SERPL-MCNC: 18 MG/DL — SIGNIFICANT CHANGE UP (ref 8–20)
CALCIUM SERPL-MCNC: 9.3 MG/DL — SIGNIFICANT CHANGE UP (ref 8.6–10.2)
CHLORIDE SERPL-SCNC: 103 MMOL/L — SIGNIFICANT CHANGE UP (ref 98–107)
CO2 SERPL-SCNC: 25 MMOL/L — SIGNIFICANT CHANGE UP (ref 22–29)
COLOR SPEC: YELLOW — SIGNIFICANT CHANGE UP
CREAT SERPL-MCNC: 0.66 MG/DL — SIGNIFICANT CHANGE UP (ref 0.5–1.3)
DIFF PNL FLD: NEGATIVE — SIGNIFICANT CHANGE UP
DIR ANTIGLOB POLYSPECIFIC INTERPRETATION: SIGNIFICANT CHANGE UP
EPI CELLS # UR: SIGNIFICANT CHANGE UP
GLUCOSE SERPL-MCNC: 91 MG/DL — SIGNIFICANT CHANGE UP (ref 70–115)
GLUCOSE UR QL: NEGATIVE MG/DL — SIGNIFICANT CHANGE UP
HCT VFR BLD CALC: 34.9 % — LOW (ref 37–47)
HGB BLD-MCNC: 10.4 G/DL — LOW (ref 12–16)
INR BLD: 1.12 RATIO — SIGNIFICANT CHANGE UP (ref 0.88–1.16)
KETONES UR-MCNC: ABNORMAL
LEUKOCYTE ESTERASE UR-ACNC: ABNORMAL
MCHC RBC-ENTMCNC: 26.2 PG — LOW (ref 27–31)
MCHC RBC-ENTMCNC: 29.8 G/DL — LOW (ref 32–36)
MCV RBC AUTO: 87.9 FL — SIGNIFICANT CHANGE UP (ref 81–99)
NITRITE UR-MCNC: POSITIVE
PH UR: 6 — SIGNIFICANT CHANGE UP (ref 5–8)
PLATELET # BLD AUTO: 206 K/UL — SIGNIFICANT CHANGE UP (ref 150–400)
POTASSIUM SERPL-MCNC: 4.1 MMOL/L — SIGNIFICANT CHANGE UP (ref 3.5–5.3)
POTASSIUM SERPL-SCNC: 4.1 MMOL/L — SIGNIFICANT CHANGE UP (ref 3.5–5.3)
PROT SERPL-MCNC: 6.7 G/DL — SIGNIFICANT CHANGE UP (ref 6.6–8.7)
PROT UR-MCNC: 15 MG/DL
PROTHROM AB SERPL-ACNC: 12.4 SEC — SIGNIFICANT CHANGE UP (ref 9.8–12.7)
RBC # BLD: 3.97 M/UL — LOW (ref 4.4–5.2)
RBC # FLD: 16.3 % — HIGH (ref 11–15.6)
RBC CASTS # UR COMP ASSIST: SIGNIFICANT CHANGE UP /HPF (ref 0–4)
SODIUM SERPL-SCNC: 141 MMOL/L — SIGNIFICANT CHANGE UP (ref 135–145)
SP GR SPEC: 1.01 — SIGNIFICANT CHANGE UP (ref 1.01–1.02)
TYPE + AB SCN PNL BLD: SIGNIFICANT CHANGE UP
UROBILINOGEN FLD QL: 4 MG/DL
WBC # BLD: 5.5 K/UL — SIGNIFICANT CHANGE UP (ref 4.8–10.8)
WBC # FLD AUTO: 5.5 K/UL — SIGNIFICANT CHANGE UP (ref 4.8–10.8)
WBC UR QL: ABNORMAL

## 2018-09-27 PROCEDURE — 72125 CT NECK SPINE W/O DYE: CPT | Mod: 26

## 2018-09-27 PROCEDURE — 72192 CT PELVIS W/O DYE: CPT | Mod: 26

## 2018-09-27 PROCEDURE — 70450 CT HEAD/BRAIN W/O DYE: CPT | Mod: 26

## 2018-09-27 PROCEDURE — 93010 ELECTROCARDIOGRAM REPORT: CPT

## 2018-09-27 PROCEDURE — 71045 X-RAY EXAM CHEST 1 VIEW: CPT | Mod: 26

## 2018-09-27 PROCEDURE — 99285 EMERGENCY DEPT VISIT HI MDM: CPT

## 2018-09-27 PROCEDURE — 72131 CT LUMBAR SPINE W/O DYE: CPT | Mod: 26

## 2018-09-27 RX ORDER — MORPHINE SULFATE 50 MG/1
2 CAPSULE, EXTENDED RELEASE ORAL ONCE
Qty: 0 | Refills: 0 | Status: DISCONTINUED | OUTPATIENT
Start: 2018-09-27 | End: 2018-09-27

## 2018-09-27 NOTE — ED PROVIDER NOTE - PROGRESS NOTE DETAILS
CT's reviewed by Radiology/Dr. Stewart and no obvious acte fx.  Spoke wioth pt's daughter and will have SW eval and hold for PT evaL IN THE am unable to participate in PT.  awake, non verbal. Dr. Isreal Gomez admitting pt, not [facility].

## 2018-09-27 NOTE — ED PROVIDER NOTE - MEDICAL DECISION MAKING DETAILS
dementia and fall with left hip tenderness will get w/u and tx pain and reeval dementia and fall with left hip tenderness will get w/u and tx pain and reeval, failed PT eval, unable to respond to commands, will treat for uti. admit for SNF placement

## 2018-09-27 NOTE — ED PROVIDER NOTE - OBJECTIVE STATEMENT
89 y/o female with 89 y/o female with h/o nontraumatic subdural hemorrhage and behavioral disturbance and unspecified dementia and 89 y/o female with h/o nontraumatic subdural hemorrhage and behavioral disturbance and unspecified dementia and she was sent from NH for s/p fall and bilateral hip and back pain in c collar pt has dementia and cannot give useful history

## 2018-09-27 NOTE — ED ADULT TRIAGE NOTE - CHIEF COMPLAINT QUOTE
pt biba from the [place] for witnessed fall, c/o right hip pain back and neck pain. ems reports pt did not hit head, pt is on aspirin

## 2018-09-27 NOTE — ED PROVIDER NOTE - MUSCULOSKELETAL, MLM
Spine appears normal, range of motion is not limited, no muscle or joint tenderness except tender over left lateral hip

## 2018-09-27 NOTE — CHART NOTE - NSCHARTNOTEFT_GEN_A_CORE
SW Note - per Dr Rhodes - pt recently decreasingly ability to ambulate - family has paid extra for 2 person assist - pt still fell today. pt increasing dementia, and may no longer be approp for AL - may require hire level of care. Son pays for [facility] - and dtr pays everything else and is there everyday at 3PM. Pt has been in [facility] a few times since new year - possibly [facility] in August, [facility] (3/18) [facility] (7/16) family very supportive but unsure what to do next. MD to req PT EVAL in AM.

## 2018-09-27 NOTE — ED PROVIDER NOTE - CARE PLAN
Principal Discharge DX:	Contusion of hip and thigh, left, initial encounter  Secondary Diagnosis:	Fall  Secondary Diagnosis:	Dementia

## 2018-09-28 DIAGNOSIS — S70.02XA CONTUSION OF LEFT HIP, INITIAL ENCOUNTER: ICD-10-CM

## 2018-09-28 PROBLEM — F32.9 MAJOR DEPRESSIVE DISORDER, SINGLE EPISODE, UNSPECIFIED: Chronic | Status: ACTIVE | Noted: 2018-03-27

## 2018-09-28 PROBLEM — F03.90 UNSPECIFIED DEMENTIA WITHOUT BEHAVIORAL DISTURBANCE: Chronic | Status: ACTIVE | Noted: 2018-04-02

## 2018-09-28 PROBLEM — I10 ESSENTIAL (PRIMARY) HYPERTENSION: Chronic | Status: ACTIVE | Noted: 2018-03-27

## 2018-09-28 LAB — ALLERGY+IMMUNOLOGY DIAG STUDY NOTE: SIGNIFICANT CHANGE UP

## 2018-09-28 PROCEDURE — 99223 1ST HOSP IP/OBS HIGH 75: CPT

## 2018-09-28 RX ORDER — MINERAL OIL
133 OIL (ML) MISCELLANEOUS ONCE
Qty: 0 | Refills: 0 | Status: COMPLETED | OUTPATIENT
Start: 2018-09-28 | End: 2018-09-28

## 2018-09-28 RX ORDER — CEFTRIAXONE 500 MG/1
1 INJECTION, POWDER, FOR SOLUTION INTRAMUSCULAR; INTRAVENOUS EVERY 24 HOURS
Qty: 0 | Refills: 0 | Status: DISCONTINUED | OUTPATIENT
Start: 2018-09-28 | End: 2018-10-01

## 2018-09-28 RX ORDER — POLYETHYLENE GLYCOL 3350 17 G/17G
17 POWDER, FOR SOLUTION ORAL DAILY
Qty: 0 | Refills: 0 | Status: COMPLETED | OUTPATIENT
Start: 2018-09-28 | End: 2018-10-01

## 2018-09-28 RX ORDER — SENNA PLUS 8.6 MG/1
2 TABLET ORAL AT BEDTIME
Qty: 0 | Refills: 0 | Status: DISCONTINUED | OUTPATIENT
Start: 2018-09-28 | End: 2018-10-01

## 2018-09-28 RX ORDER — FLUOXETINE HCL 10 MG
10 CAPSULE ORAL DAILY
Qty: 0 | Refills: 0 | Status: DISCONTINUED | OUTPATIENT
Start: 2018-09-28 | End: 2018-10-01

## 2018-09-28 RX ORDER — LISINOPRIL 2.5 MG/1
2.5 TABLET ORAL DAILY
Qty: 0 | Refills: 0 | Status: DISCONTINUED | OUTPATIENT
Start: 2018-09-28 | End: 2018-10-01

## 2018-09-28 RX ORDER — DOCUSATE SODIUM 100 MG
100 CAPSULE ORAL DAILY
Qty: 0 | Refills: 0 | Status: DISCONTINUED | OUTPATIENT
Start: 2018-09-28 | End: 2018-09-29

## 2018-09-28 RX ORDER — ASPIRIN/CALCIUM CARB/MAGNESIUM 324 MG
81 TABLET ORAL DAILY
Qty: 0 | Refills: 0 | Status: DISCONTINUED | OUTPATIENT
Start: 2018-09-28 | End: 2018-10-01

## 2018-09-28 RX ORDER — HYDRALAZINE HCL 50 MG
10 TABLET ORAL THREE TIMES A DAY
Qty: 0 | Refills: 0 | Status: DISCONTINUED | OUTPATIENT
Start: 2018-09-28 | End: 2018-09-29

## 2018-09-28 RX ORDER — FOLIC ACID 0.8 MG
1 TABLET ORAL DAILY
Qty: 0 | Refills: 0 | Status: DISCONTINUED | OUTPATIENT
Start: 2018-09-28 | End: 2018-10-01

## 2018-09-28 RX ORDER — ALLOPURINOL 300 MG
100 TABLET ORAL DAILY
Qty: 0 | Refills: 0 | Status: DISCONTINUED | OUTPATIENT
Start: 2018-09-28 | End: 2018-10-01

## 2018-09-28 RX ORDER — VALPROIC ACID (AS SODIUM SALT) 250 MG/5ML
250 SOLUTION, ORAL ORAL THREE TIMES A DAY
Qty: 0 | Refills: 0 | Status: DISCONTINUED | OUTPATIENT
Start: 2018-09-28 | End: 2018-10-01

## 2018-09-28 RX ORDER — SODIUM CHLORIDE 9 MG/ML
1000 INJECTION INTRAMUSCULAR; INTRAVENOUS; SUBCUTANEOUS
Qty: 0 | Refills: 0 | Status: COMPLETED | OUTPATIENT
Start: 2018-09-28 | End: 2018-09-29

## 2018-09-28 RX ORDER — METOPROLOL TARTRATE 50 MG
50 TABLET ORAL
Qty: 0 | Refills: 0 | Status: DISCONTINUED | OUTPATIENT
Start: 2018-09-28 | End: 2018-09-30

## 2018-09-28 RX ORDER — CEFTRIAXONE 500 MG/1
1 INJECTION, POWDER, FOR SOLUTION INTRAMUSCULAR; INTRAVENOUS ONCE
Qty: 0 | Refills: 0 | Status: COMPLETED | OUTPATIENT
Start: 2018-09-28 | End: 2018-09-28

## 2018-09-28 RX ADMIN — Medication 50 MILLIGRAM(S): at 21:36

## 2018-09-28 RX ADMIN — Medication 10 MILLIGRAM(S): at 12:13

## 2018-09-28 RX ADMIN — Medication 81 MILLIGRAM(S): at 12:14

## 2018-09-28 RX ADMIN — SODIUM CHLORIDE 75 MILLILITER(S): 9 INJECTION INTRAMUSCULAR; INTRAVENOUS; SUBCUTANEOUS at 11:32

## 2018-09-28 RX ADMIN — Medication 133 MILLILITER(S): at 13:47

## 2018-09-28 RX ADMIN — CEFTRIAXONE 100 GRAM(S): 500 INJECTION, POWDER, FOR SOLUTION INTRAMUSCULAR; INTRAVENOUS at 09:59

## 2018-09-28 NOTE — PROVIDER CONTACT NOTE (OTHER) - ASSESSMENT
Pt's eyes are open. Pt not responding to questions, not making eye contact, resisting PROM and not following commands.

## 2018-09-28 NOTE — SWALLOW BEDSIDE ASSESSMENT ADULT - SLP PERTINENT HISTORY OF CURRENT PROBLEM
Pt known to this service for prior admission. Was seen in March 2018 with RX for regular diet/thin fluids

## 2018-09-28 NOTE — ED ADULT NURSE REASSESSMENT NOTE - COMFORT CARE
plan of care explained/side rails up/wait time explained/warm blanket provided/repositioned/treatment delay explained

## 2018-09-28 NOTE — SWALLOW BEDSIDE ASSESSMENT ADULT - COMMENTS
Objective Statement: 89 y/o female with h/o nontraumatic subdural hemorrhage and behavioral disturbance and unspecified dementia and she was sent from NH for s/p fall and bilateral hip and back pain in c collar pt has dementia and cannot give useful history

## 2018-09-28 NOTE — H&P ADULT - HISTORY OF PRESENT ILLNESS
Patient is a 89 year old female with PMH of Dementia, Subdural Hematoma, UTIs, HTN, Depression, & Aortic Aneurysm (6-7 cm) who was sent to the ED from [facility] after sustaining a mechanical fall. Patient is unable to provide any history. All information obtained from chart review and daughter Ritika. Per patient's daughter, patient was recently hospitalized in [facility] due to UTI. Daughter states that patient is usually alert and interactive, but has noted that she has not been her usual self in the past few days. Daughter states she had hired 2 person assist at [facility] and somehow her mother still managed to fall to her knees yesterday. In the ED, patient had a negative CT head and cervical spine. Further imaging reveal chronic compression deformities. CXR with aortic knob aneurysm which daughter states she is aware about and does not want any aggressive measures. UA positive for UTI and patient was started on Ceftriaxone. Daughter states patient is DNR/DNI and will bring in her MOLST form. Ultimately, daughter would like patient placed back at [facility] once infection is treated and patient is assessed by PT. Social work/case management on board.

## 2018-09-28 NOTE — ED ADULT NURSE NOTE - CHIEF COMPLAINT QUOTE
pt biba from the [place] for witnessed fall, c/o right hip pain back and neck pain. ems reports pt did not hit head, pt is on aspirin

## 2018-09-28 NOTE — SWALLOW BEDSIDE ASSESSMENT ADULT - ORAL PREPARATORY PHASE
pt accepted only small portions from tspn/Reduced oral grading pt accepted only small bites/Reduced oral grading Within functional limits

## 2018-09-28 NOTE — SWALLOW BEDSIDE ASSESSMENT ADULT - SWALLOW EVAL: RECOMMENDED FEEDING/EATING TECHNIQUES
maintain upright posture during/after eating for 30 mins/allow for swallow between intakes/small sips/bites/crush medication (when feasible)/check mouth frequently for oral residue/pocketing/position upright (90 degrees)

## 2018-09-28 NOTE — ED ADULT NURSE NOTE - NSIMPLEMENTINTERV_GEN_ALL_ED
Implemented All Fall with Harm Risk Interventions:  [place] to call system. Call bell, personal items and telephone within reach. Instruct patient to call for assistance. Room bathroom lighting operational. Non-slip footwear when patient is off stretcher. Physically safe environment: no spills, clutter or unnecessary equipment. Stretcher in lowest position, wheels locked, appropriate side rails in place. Provide visual cue, wrist band, yellow gown, etc. Monitor gait and stability. Monitor for mental status changes and reorient to person, place, and time. Review medications for side effects contributing to fall risk. Reinforce activity limits and safety measures with patient and family. Provide visual clues: red socks.

## 2018-09-28 NOTE — ED ADULT NURSE REASSESSMENT NOTE - NS ED NURSE REASSESS COMMENT FT1
Pt resting comfortably in bed, awaiting PT consult.
Pt received in the stretcher resting comfortably, alert and oriented to herself only, no distress noted, pt poor historian , no complaints at this time, awaiting  admission orders , will continue to monitor

## 2018-09-28 NOTE — SWALLOW BEDSIDE ASSESSMENT ADULT - ASR SWALLOW ASPIRATION MONITOR
change of breathing pattern/position upright (90Y)/throat clearing/oral hygiene/fever/pneumonia/upper respiratory infection/cough/gurgly voice

## 2018-09-29 LAB
-  AMIKACIN: SIGNIFICANT CHANGE UP
-  AMPICILLIN/SULBACTAM: SIGNIFICANT CHANGE UP
-  AMPICILLIN: SIGNIFICANT CHANGE UP
-  AZTREONAM: SIGNIFICANT CHANGE UP
-  CEFAZOLIN: SIGNIFICANT CHANGE UP
-  CEFEPIME: SIGNIFICANT CHANGE UP
-  CEFOXITIN: SIGNIFICANT CHANGE UP
-  CEFTRIAXONE: SIGNIFICANT CHANGE UP
-  CIPROFLOXACIN: SIGNIFICANT CHANGE UP
-  ERTAPENEM: SIGNIFICANT CHANGE UP
-  GENTAMICIN: SIGNIFICANT CHANGE UP
-  IMIPENEM: SIGNIFICANT CHANGE UP
-  LEVOFLOXACIN: SIGNIFICANT CHANGE UP
-  MEROPENEM: SIGNIFICANT CHANGE UP
-  NITROFURANTOIN: SIGNIFICANT CHANGE UP
-  PIPERACILLIN/TAZOBACTAM: SIGNIFICANT CHANGE UP
-  TIGECYCLINE: SIGNIFICANT CHANGE UP
-  TOBRAMYCIN: SIGNIFICANT CHANGE UP
-  TRIMETHOPRIM/SULFAMETHOXAZOLE: SIGNIFICANT CHANGE UP
ANION GAP SERPL CALC-SCNC: 13 MMOL/L — SIGNIFICANT CHANGE UP (ref 5–17)
BASOPHILS # BLD AUTO: 0 K/UL — SIGNIFICANT CHANGE UP (ref 0–0.2)
BASOPHILS NFR BLD AUTO: 0.7 % — SIGNIFICANT CHANGE UP (ref 0–2)
BUN SERPL-MCNC: 18 MG/DL — SIGNIFICANT CHANGE UP (ref 8–20)
CALCIUM SERPL-MCNC: 9.3 MG/DL — SIGNIFICANT CHANGE UP (ref 8.6–10.2)
CHLORIDE SERPL-SCNC: 103 MMOL/L — SIGNIFICANT CHANGE UP (ref 98–107)
CO2 SERPL-SCNC: 25 MMOL/L — SIGNIFICANT CHANGE UP (ref 22–29)
CREAT SERPL-MCNC: 0.6 MG/DL — SIGNIFICANT CHANGE UP (ref 0.5–1.3)
CULTURE RESULTS: SIGNIFICANT CHANGE UP
EOSINOPHIL # BLD AUTO: 0.2 K/UL — SIGNIFICANT CHANGE UP (ref 0–0.5)
EOSINOPHIL NFR BLD AUTO: 3.9 % — SIGNIFICANT CHANGE UP (ref 0–6)
FERRITIN SERPL-MCNC: 157 NG/ML — HIGH (ref 15–150)
FOLATE SERPL-MCNC: >20 NG/ML — SIGNIFICANT CHANGE UP
GLUCOSE SERPL-MCNC: 78 MG/DL — SIGNIFICANT CHANGE UP (ref 70–115)
HCT VFR BLD CALC: 36.1 % — LOW (ref 37–47)
HGB BLD-MCNC: 10.7 G/DL — LOW (ref 12–16)
IRON SATN MFR SERPL: 15 % — SIGNIFICANT CHANGE UP (ref 14–50)
IRON SATN MFR SERPL: 43 UG/DL — SIGNIFICANT CHANGE UP (ref 37–145)
LYMPHOCYTES # BLD AUTO: 1.4 K/UL — SIGNIFICANT CHANGE UP (ref 1–4.8)
LYMPHOCYTES # BLD AUTO: 34.4 % — SIGNIFICANT CHANGE UP (ref 20–55)
MAGNESIUM SERPL-MCNC: 2 MG/DL — SIGNIFICANT CHANGE UP (ref 1.6–2.6)
MCHC RBC-ENTMCNC: 26.4 PG — LOW (ref 27–31)
MCHC RBC-ENTMCNC: 29.6 G/DL — LOW (ref 32–36)
MCV RBC AUTO: 88.9 FL — SIGNIFICANT CHANGE UP (ref 81–99)
METHOD TYPE: SIGNIFICANT CHANGE UP
MONOCYTES # BLD AUTO: 0.4 K/UL — SIGNIFICANT CHANGE UP (ref 0–0.8)
MONOCYTES NFR BLD AUTO: 10.7 % — HIGH (ref 3–10)
NEUTROPHILS # BLD AUTO: 2 K/UL — SIGNIFICANT CHANGE UP (ref 1.8–8)
NEUTROPHILS NFR BLD AUTO: 49.8 % — SIGNIFICANT CHANGE UP (ref 37–73)
ORGANISM # SPEC MICROSCOPIC CNT: SIGNIFICANT CHANGE UP
ORGANISM # SPEC MICROSCOPIC CNT: SIGNIFICANT CHANGE UP
PHOSPHATE SERPL-MCNC: 3.6 MG/DL — SIGNIFICANT CHANGE UP (ref 2.4–4.7)
PLATELET # BLD AUTO: 203 K/UL — SIGNIFICANT CHANGE UP (ref 150–400)
POTASSIUM SERPL-MCNC: 4 MMOL/L — SIGNIFICANT CHANGE UP (ref 3.5–5.3)
POTASSIUM SERPL-SCNC: 4 MMOL/L — SIGNIFICANT CHANGE UP (ref 3.5–5.3)
RBC # BLD: 4.06 M/UL — LOW (ref 4.4–5.2)
RBC # FLD: 16.3 % — HIGH (ref 11–15.6)
SODIUM SERPL-SCNC: 141 MMOL/L — SIGNIFICANT CHANGE UP (ref 135–145)
SPECIMEN SOURCE: SIGNIFICANT CHANGE UP
TIBC SERPL-MCNC: 289 UG/DL — SIGNIFICANT CHANGE UP (ref 220–430)
TRANSFERRIN SERPL-MCNC: 202 MG/DL — SIGNIFICANT CHANGE UP (ref 192–382)
TSH SERPL-MCNC: 2.32 UIU/ML — SIGNIFICANT CHANGE UP (ref 0.27–4.2)
VALPROATE SERPL-MCNC: 8.5 UG/ML — LOW (ref 50–100)
VIT B12 SERPL-MCNC: 578 PG/ML — SIGNIFICANT CHANGE UP (ref 232–1245)
WBC # BLD: 4.1 K/UL — LOW (ref 4.8–10.8)
WBC # FLD AUTO: 4.1 K/UL — LOW (ref 4.8–10.8)

## 2018-09-29 PROCEDURE — 99232 SBSQ HOSP IP/OBS MODERATE 35: CPT

## 2018-09-29 RX ORDER — DOCUSATE SODIUM 100 MG
100 CAPSULE ORAL
Qty: 0 | Refills: 0 | Status: DISCONTINUED | OUTPATIENT
Start: 2018-09-29 | End: 2018-10-01

## 2018-09-29 RX ORDER — ENOXAPARIN SODIUM 100 MG/ML
40 INJECTION SUBCUTANEOUS DAILY
Qty: 0 | Refills: 0 | Status: DISCONTINUED | OUTPATIENT
Start: 2018-09-29 | End: 2018-10-01

## 2018-09-29 RX ADMIN — Medication 1 MILLIGRAM(S): at 12:36

## 2018-09-29 RX ADMIN — Medication 10 MILLIGRAM(S): at 12:29

## 2018-09-29 RX ADMIN — Medication 100 MILLIGRAM(S): at 18:46

## 2018-09-29 RX ADMIN — Medication 250 MILLIGRAM(S): at 14:17

## 2018-09-29 RX ADMIN — CEFTRIAXONE 100 GRAM(S): 500 INJECTION, POWDER, FOR SOLUTION INTRAMUSCULAR; INTRAVENOUS at 12:26

## 2018-09-29 RX ADMIN — Medication 50 MILLIGRAM(S): at 18:46

## 2018-09-29 RX ADMIN — Medication 81 MILLIGRAM(S): at 12:36

## 2018-09-29 RX ADMIN — POLYETHYLENE GLYCOL 3350 17 GRAM(S): 17 POWDER, FOR SOLUTION ORAL at 12:37

## 2018-09-29 RX ADMIN — Medication 100 MILLIGRAM(S): at 12:29

## 2018-09-29 RX ADMIN — SODIUM CHLORIDE 75 MILLILITER(S): 9 INJECTION INTRAMUSCULAR; INTRAVENOUS; SUBCUTANEOUS at 14:16

## 2018-09-29 RX ADMIN — ENOXAPARIN SODIUM 40 MILLIGRAM(S): 100 INJECTION SUBCUTANEOUS at 12:37

## 2018-09-29 RX ADMIN — LISINOPRIL 2.5 MILLIGRAM(S): 2.5 TABLET ORAL at 12:25

## 2018-09-29 RX ADMIN — Medication 50 MILLIGRAM(S): at 12:26

## 2018-09-29 NOTE — PROGRESS NOTE ADULT - SUBJECTIVE AND OBJECTIVE BOX
PMD:     Patient seen and examined at the bedside. No acute overnight events. - yesterday. Complaining of - this AM. Denies fever/chills, headache, lightheadedness, dizziness, chest pain, palpitations, shortness of breath, cough, abd pain, nausea/vomiting/diarrhea, muscle pain.      =========================================================================================  T(C): 36.4 (18 @ 07:45), Max: 36.7 (18 @ 22:32)  HR: 65 (18 @ 07:45) (60 - 68)  BP: 135/97 (18 @ 07:45) (115/91 - 142/95)  RR: 18 (18 @ 07:45) (16 - 18)  SpO2: 94% (18 @ 07:45) (94% - 98%)    PHYSICAL EXAM.    GEN - appears age appropriate. well nourished. pleasant. no distress.   HEENT - NCAT, EOMI, CHEVY  RESP - CTA BL, no wheeze/stridor/rhonchi/crackles. not on supplemental O2. able to speak in full sentences without distress.   CARDIO - NS1S2, RRR. No murmurs/rubs/gallops.  ABD - Soft/Non tender/Non distended. Normal BS x4 quadrants. no guarding/rebound tenderness.  Ext - No JAZMINE.  MSK - BL 5/5 strength on upper and lower extremities.   Neuro - AAOx3. cn 2-12 grossly intact  Psych - normal affect  Skin - c/d/i. no rashes/lesions      I&O's Summary    Daily     Daily     =========================================================================================  LABS.        141  |  103  |  18.0  ----------------------------<  78  4.0   |  25.0  |  0.60    Ca    9.3      29 Sep 2018 06:54  Phos  3.6       Mg     2.0         TPro  6.7  /  Alb  3.9  /  TBili  0.8  /  DBili  x   /  AST  19  /  ALT  16  /  AlkPhos  94                            10.7   4.1   )-----------( 203      ( 29 Sep 2018 06:54 )             36.1     LIVER FUNCTIONS - ( 27 Sep 2018 21:04 )  Alb: 3.9 g/dL / Pro: 6.7 g/dL / ALK PHOS: 94 U/L / ALT: 16 U/L / AST: 19 U/L / GGT: x           PT/INR - ( 27 Sep 2018 21:04 )   PT: 12.4 sec;   INR: 1.12 ratio         PTT - ( 27 Sep 2018 21:04 )  PTT:31.7 sec  CARDIAC MARKERS ( 27 Sep 2018 21:04 )  x     / <0.01 ng/mL / x     / x     / x          Urinalysis Basic - ( 27 Sep 2018 23:05 )    Color: Yellow / Appearance: Clear / S.015 / pH: x  Gluc: x / Ketone: Trace  / Bili: Negative / Urobili: 4 mg/dL   Blood: x / Protein: 15 mg/dL / Nitrite: Positive   Leuk Esterase: Small / RBC: 0-2 /HPF / WBC 6-10   Sq Epi: x / Non Sq Epi: Occasional / Bacteria: Moderate          =========================================================================================  IMAGING.     =========================================================================================    HOME MEDS.    Home Medications:  allopurinol 100 mg oral tablet: 1 tab(s) orally once a day (28 Sep 2018 17:33)  aspirin 81 mg oral delayed release tablet: 1 tab(s) orally once a day (30 Mar 2018 12:20)  FLUoxetine 20 mg/5 mL oral solution: 10 milliliter(s) orally once a day (30 Mar 2018 12:20)  folic acid 1 mg oral tablet: 1 tab(s) orally once a day (27 Mar 2018 01:40)  lisinopril 2.5 mg oral tablet: 1 tab(s) orally once a day (30 Mar 2018 12:20)  metoprolol tartrate 50 mg oral tablet: 1 tab(s) orally 2 times a day (30 Mar 2018 12:20)  valproic acid 250 mg/5 mL oral syrup: 5 milliliter(s) orally 3 times a day (27 Mar 2018 05:11)      =========================================================================================    HOSPITAL MEDS.    MEDICATIONS  (STANDING):  allopurinol 100 milliGRAM(s) Oral daily  aspirin  chewable 81 milliGRAM(s) Oral daily  cefTRIAXone   IVPB 1 Gram(s) IV Intermittent every 24 hours  docusate sodium 100 milliGRAM(s) Oral daily  FLUoxetine 10 milliGRAM(s) Oral daily  folic acid 1 milliGRAM(s) Oral daily  lisinopril 2.5 milliGRAM(s) Oral daily  metoprolol tartrate 50 milliGRAM(s) Oral two times a day  polyethylene glycol 3350 17 Gram(s) Oral daily  senna 2 Tablet(s) Oral at bedtime  sodium chloride 0.9%. 1000 milliLiter(s) (75 mL/Hr) IV Continuous <Continuous>  valproic acid 250 milliGRAM(s) Oral three times a day    MEDICATIONS  (PRN):  hydrALAZINE Injectable 10 milliGRAM(s) IV Push three times a day PRN SBP >150 PMD: unknown    Patient seen and examined at the bedside. No acute overnight events. No events yesterday. Complaining of nothing this AM. Poor historian, does not answer questions appropriately but does not endorse having fever/chills, headache, lightheadedness, dizziness, chest pain, palpitations, shortness of breath, cough, abd pain, nausea/vomiting/diarrhea, muscle pain.      =========================================================================================  T(C): 36.4 (18 @ 07:45), Max: 36.7 (18 @ 22:32)  HR: 65 (18 @ 07:45) (60 - 68)  BP: 135/97 (18 @ 07:45) (115/91 - 142/95)  RR: 18 (18 @ 07:45) (16 - 18)  SpO2: 94% (18 @ 07:45) (94% - 98%)    PHYSICAL EXAM.    GEN - appears younger than stated age. well nourished. pleasant. no distress.   HEENT - NCAT, EOMI, CHEVY  RESP - CTA BL, no wheeze/stridor/rhonchi/crackles. not on supplemental O2. able to speak in full sentences without distress.   CARDIO - NS1S2, RRR. No murmurs/rubs/gallops.  ABD - Soft/Non tender/Non distended. Normal BS x4 quadrants. no guarding/rebound tenderness.  Ext - No JAZMINE.  MSK - BL 4/5 strength on upper and lower extremities.   Neuro - AAOx0. cn 2-12 grossly intact  Psych - normal affect. responsive to questioning but does not answer questions appropriately  Skin - c/d/i. no rashes/lesions      I&O's Summary    Daily     Daily     =========================================================================================  LABS.        141  |  103  |  18.0  ----------------------------<  78  4.0   |  25.0  |  0.60    Ca    9.3      29 Sep 2018 06:54  Phos  3.6       Mg     2.0         TPro  6.7  /  Alb  3.9  /  TBili  0.8  /  DBili  x   /  AST  19  /  ALT  16  /  AlkPhos  94                            10.7   4.1   )-----------( 203      ( 29 Sep 2018 06:54 )             36.1     LIVER FUNCTIONS - ( 27 Sep 2018 21:04 )  Alb: 3.9 g/dL / Pro: 6.7 g/dL / ALK PHOS: 94 U/L / ALT: 16 U/L / AST: 19 U/L / GGT: x           PT/INR - ( 27 Sep 2018 21:04 )   PT: 12.4 sec;   INR: 1.12 ratio         PTT - ( 27 Sep 2018 21:04 )  PTT:31.7 sec  CARDIAC MARKERS ( 27 Sep 2018 21:04 )  x     / <0.01 ng/mL / x     / x     / x          Urinalysis Basic - ( 27 Sep 2018 23:05 )    Color: Yellow / Appearance: Clear / S.015 / pH: x  Gluc: x / Ketone: Trace  / Bili: Negative / Urobili: 4 mg/dL   Blood: x / Protein: 15 mg/dL / Nitrite: Positive   Leuk Esterase: Small / RBC: 0-2 /HPF / WBC 6-10   Sq Epi: x / Non Sq Epi: Occasional / Bacteria: Moderate          =========================================================================================  IMAGING.     =========================================================================================    HOME MEDS.    Home Medications:  allopurinol 100 mg oral tablet: 1 tab(s) orally once a day (28 Sep 2018 17:33)  aspirin 81 mg oral delayed release tablet: 1 tab(s) orally once a day (30 Mar 2018 12:20)  FLUoxetine 20 mg/5 mL oral solution: 10 milliliter(s) orally once a day (30 Mar 2018 12:20)  folic acid 1 mg oral tablet: 1 tab(s) orally once a day (27 Mar 2018 01:40)  lisinopril 2.5 mg oral tablet: 1 tab(s) orally once a day (30 Mar 2018 12:20)  metoprolol tartrate 50 mg oral tablet: 1 tab(s) orally 2 times a day (30 Mar 2018 12:20)  valproic acid 250 mg/5 mL oral syrup: 5 milliliter(s) orally 3 times a day (27 Mar 2018 05:11)      =========================================================================================    HOSPITAL MEDS.    MEDICATIONS  (STANDING):  allopurinol 100 milliGRAM(s) Oral daily  aspirin  chewable 81 milliGRAM(s) Oral daily  cefTRIAXone   IVPB 1 Gram(s) IV Intermittent every 24 hours  docusate sodium 100 milliGRAM(s) Oral daily  FLUoxetine 10 milliGRAM(s) Oral daily  folic acid 1 milliGRAM(s) Oral daily  lisinopril 2.5 milliGRAM(s) Oral daily  metoprolol tartrate 50 milliGRAM(s) Oral two times a day  polyethylene glycol 3350 17 Gram(s) Oral daily  senna 2 Tablet(s) Oral at bedtime  sodium chloride 0.9%. 1000 milliLiter(s) (75 mL/Hr) IV Continuous <Continuous>  valproic acid 250 milliGRAM(s) Oral three times a day    MEDICATIONS  (PRN):  hydrALAZINE Injectable 10 milliGRAM(s) IV Push three times a day PRN SBP >150

## 2018-09-29 NOTE — PROGRESS NOTE ADULT - ASSESSMENT
Patient is a 89 year old female with PMH of Dementia, Subdural Hematoma, UTIs, HTN, Depression, & Aortic Aneurysm (6-7 cm) who was sent to the ED from [facility] after sustaining a mechanical fall and subsequently found to have a UTI.    Mechanical Fall  -admit to any medical bed  -CT head/neck unremarkable  -CT spine with chronic compression deformities  -Fall precautions  -PT evaluation for placement    Toxic Metabolic Encephalopathy secondary to UTI superimposed on underlying history of Dementia  -f/u urine culture  -IV Ceftriaxone  -Judicious hydration  -CT head negative for acute pathology  -Monitor mental status closely (per family patient is awake and interactive at baseline)    GNR UTI w/o hematuria: STABLE. present on UA done on arrival. UC prelim positive for GNR. currently on Rocephin started 9/28  -fu final UC  -anticipate 3-5d course abx pending cx results    Dementia  -supportive care     HTN  -resume lisinopril and metoprolol  -low sodium diet    Depression  -continue fluoxetine   -continue valproic acid  -check valproic acid level    Aortic Aneurysm   -known to family; daughter states most recently she was informed it was 6-7 cm  -CXR with aortic knob aneurysm 6.5 cm  -daughter states family member is a cardiothoracic surgeon and recommended no aggressive measures  -continue ASA daily    Stool impaction   -incidental finding on CT  -enema ordered  -add stool softeners     Hard of hearing  -daughter requesting ENT evaluation and informed this can be done as outpatient    Normocytic Anemia: STABLE. asymptomatic. baseline Hgb 13 from earlier this year. B12 + Folate WNL. Iron studies largely unremarkable except for mildly elevated ferritin, ? AOCD. no signs acute/active bleeding @ this time, hemodynamically stable.   -full tay to be completed as outpatient    Dysphagia: STABLE. SLP evaluation appreciated  -mechanical soft with thin liquid    DVT prophylaxis - SCDs (no heparin given recent history)    GOC: Patient is DNR/DNI (MOLST form to be brought in by daughter Ritika who confirms patient's code status).    Dispo: PT evaluation for placement pending. Family requesting patient return to [place]. Patient is a 89 year old female with PMH of Dementia, Subdural Hematoma, UTIs, HTN, Depression, & Aortic Aneurysm (6-7 cm) who was sent to the ED from [facility] after sustaining a mechanical fall and subsequently found to have a UTI.    Toxic Metabolic Encephalopathy   -IMPROVED  -present on arrival  -secondary to UTI superimposed on underlying history of Dementia  -CT head negative for acute pathology  -per family patient is awake and interactive at baseline, both present assuming pt returning to her normal  -cont treatement of underlying infection    Mechanical Fall  -STABLE  -present prior to admit  -likely multifactorial, suspect baseline instability with worsening from acute infection  -CT head/neck unremarkable  -CT spine with chronic compression deformities  -Fall precautions  -PT evaluation for placement pending, Family requesting patient return to [facility].     GNR UTI w/o hematuria:   -STABLE. present on UA done on arrival. UC prelim positive for GNR. currently on Rocephin started 9/28  -fu final UC  -anticipate 3-5d course abx pending cx results    Dysphagia: STABLE. sec to dementia. SLP evaluation appreciated  -pureed diet with thin liquid  -asp precautions, assist with feeds    Dementia  -STABLE  -w/o behavioral disturbances  -modified diet for dementia related dysphagia  -supportive care     HTN  -goal BP <150/90, controlled  -cont lisinopril and metoprolol  -low sodium diet    Depression  -STABLE, uncomplicated  -continue fluoxetine   -continue valproic acid, level subtherapeutic, outpatient dose titration    Aortic Aneurysm   -STABLE, chronic  -known to family; daughter states most recently she was informed it was 6-7 cm  -CXR with aortic knob aneurysm 6.5 cm  -daughter states family member is a cardiothoracic surgeon and recommended no aggressive measures  -continue ASA daily    Stool impaction   -STABLE  -incidental finding on CT  -sp suppository  -cont stool softeners w/ PRN laxative     Hard of hearing  -chronic, uncomplicated  -daughter requested ENT evaluation and informed this can be done as outpatient, agreeable to plan    Normocytic Anemia: STABLE. asymptomatic. baseline Hgb 13 from earlier this year. B12 + Folate WNL. Iron studies largely unremarkable except for mildly elevated ferritin, ? AOCD. no signs acute/active bleeding @ this time, hemodynamically stable.   -full tay to be completed as outpatient    DVT prophylaxis - SCDs + Lovenox     GOC: Patient is DNR/DNI (MOLST form to be brought in by daughter Ritika who confirms patient's code status).

## 2018-09-30 LAB
BASOPHILS # BLD AUTO: 0 K/UL — SIGNIFICANT CHANGE UP (ref 0–0.2)
BASOPHILS NFR BLD AUTO: 0.5 % — SIGNIFICANT CHANGE UP (ref 0–2)
EOSINOPHIL # BLD AUTO: 0.2 K/UL — SIGNIFICANT CHANGE UP (ref 0–0.5)
EOSINOPHIL NFR BLD AUTO: 4 % — SIGNIFICANT CHANGE UP (ref 0–6)
HCT VFR BLD CALC: 34.7 % — LOW (ref 37–47)
HGB BLD-MCNC: 10.1 G/DL — LOW (ref 12–16)
LYMPHOCYTES # BLD AUTO: 1.4 K/UL — SIGNIFICANT CHANGE UP (ref 1–4.8)
LYMPHOCYTES # BLD AUTO: 34.4 % — SIGNIFICANT CHANGE UP (ref 20–55)
MCHC RBC-ENTMCNC: 25.8 PG — LOW (ref 27–31)
MCHC RBC-ENTMCNC: 29.1 G/DL — LOW (ref 32–36)
MCV RBC AUTO: 88.7 FL — SIGNIFICANT CHANGE UP (ref 81–99)
MONOCYTES # BLD AUTO: 0.4 K/UL — SIGNIFICANT CHANGE UP (ref 0–0.8)
MONOCYTES NFR BLD AUTO: 10.4 % — HIGH (ref 3–10)
NEUTROPHILS # BLD AUTO: 2.1 K/UL — SIGNIFICANT CHANGE UP (ref 1.8–8)
NEUTROPHILS NFR BLD AUTO: 50 % — SIGNIFICANT CHANGE UP (ref 37–73)
PLATELET # BLD AUTO: 199 K/UL — SIGNIFICANT CHANGE UP (ref 150–400)
RBC # BLD: 3.91 M/UL — LOW (ref 4.4–5.2)
RBC # FLD: 16.1 % — HIGH (ref 11–15.6)
WBC # BLD: 4.2 K/UL — LOW (ref 4.8–10.8)
WBC # FLD AUTO: 4.2 K/UL — LOW (ref 4.8–10.8)

## 2018-09-30 PROCEDURE — 99233 SBSQ HOSP IP/OBS HIGH 50: CPT

## 2018-09-30 RX ORDER — METOPROLOL TARTRATE 50 MG
25 TABLET ORAL
Qty: 0 | Refills: 0 | Status: DISCONTINUED | OUTPATIENT
Start: 2018-09-30 | End: 2018-10-01

## 2018-09-30 RX ORDER — CALCIUM CARBONATE 500(1250)
1 TABLET ORAL
Qty: 0 | Refills: 0 | Status: DISCONTINUED | OUTPATIENT
Start: 2018-09-30 | End: 2018-10-01

## 2018-09-30 RX ORDER — CHOLECALCIFEROL (VITAMIN D3) 125 MCG
1000 CAPSULE ORAL DAILY
Qty: 0 | Refills: 0 | Status: DISCONTINUED | OUTPATIENT
Start: 2018-09-30 | End: 2018-10-01

## 2018-09-30 RX ADMIN — SENNA PLUS 2 TABLET(S): 8.6 TABLET ORAL at 21:48

## 2018-09-30 RX ADMIN — CEFTRIAXONE 100 GRAM(S): 500 INJECTION, POWDER, FOR SOLUTION INTRAMUSCULAR; INTRAVENOUS at 11:36

## 2018-09-30 RX ADMIN — LISINOPRIL 2.5 MILLIGRAM(S): 2.5 TABLET ORAL at 05:55

## 2018-09-30 RX ADMIN — Medication 1000 UNIT(S): at 14:31

## 2018-09-30 RX ADMIN — Medication 1 TABLET(S): at 17:41

## 2018-09-30 RX ADMIN — Medication 100 MILLIGRAM(S): at 05:55

## 2018-09-30 RX ADMIN — Medication 1 MILLIGRAM(S): at 11:41

## 2018-09-30 RX ADMIN — Medication 100 MILLIGRAM(S): at 11:36

## 2018-09-30 RX ADMIN — Medication 100 MILLIGRAM(S): at 17:39

## 2018-09-30 RX ADMIN — Medication 250 MILLIGRAM(S): at 05:55

## 2018-09-30 RX ADMIN — POLYETHYLENE GLYCOL 3350 17 GRAM(S): 17 POWDER, FOR SOLUTION ORAL at 11:39

## 2018-09-30 RX ADMIN — Medication 25 MILLIGRAM(S): at 17:41

## 2018-09-30 RX ADMIN — Medication 250 MILLIGRAM(S): at 14:31

## 2018-09-30 RX ADMIN — Medication 10 MILLIGRAM(S): at 11:36

## 2018-09-30 RX ADMIN — ENOXAPARIN SODIUM 40 MILLIGRAM(S): 100 INJECTION SUBCUTANEOUS at 11:36

## 2018-09-30 RX ADMIN — Medication 81 MILLIGRAM(S): at 11:35

## 2018-09-30 NOTE — CHART NOTE - NSCHARTNOTEFT_GEN_A_CORE
Called daughter Ritika Spangler @ number listed in chart. Attempted to call family to provide general update on patient's medical condition. No answer, voicemail left with call back information provided. Will continue to follow up.

## 2018-09-30 NOTE — PROGRESS NOTE ADULT - ASSESSMENT
Patient is a 89 year old female with PMH of Dementia, Subdural Hematoma, UTIs, HTN, Depression, & Aortic Aneurysm (6-7 cm) who was sent to the ED from [facility] after sustaining a mechanical fall and subsequently found to have a UTI.    Toxic Metabolic Encephalopathy   -RESOLVED  -present on arrival  -secondary to UTI superimposed on underlying history of Dementia  -CT head negative for acute pathology  -per family patient is awake and interactive at baseline, both present assuming pt returning to her normal  -cont treatement of underlying infection    Mechanical Fall  -STABLE  -present prior to admit  -likely multifactorial, suspect baseline instability with worsening from acute infection  -CT head/neck unremarkable  -CT spine with chronic compression deformities, no acute pathology  -Fall precautions  -PT evaluation for placement pending, Family requesting patient return to [place].     Pansensitive EColi UTI w/o hematuria:   -STABLE. present on UA done on arrival. UC confirming dx. currently on Rocephin started 9/28  -anticipate 3d course abx, last day 9/30    Sinus Bradycardia:  -STABLE  -incidental finding, asymptomatic  -HR ranging in high 40s at its lowest  -dec bb dose to 1/2 home dose, cont to lower and poss stop if needed  -cont to monitor  -check TFTs in AM  -echo if remaining persistent or if symptoms develop despite change in bb dosing    T12-L2 Vertebral Compression Deformities  -STABLE  -incidentally noted on imaging, asymptomatic  -likely related to poor bone denisty  -supportive care PRN, Ca + Vit D supplementation    Dysphagia: STABLE. sec to dementia. SLP evaluation appreciated  -pureed diet with thin liquid  -asp precautions, assist with feeds    Dementia  -STABLE  -w/o behavioral disturbances  -modified diet for dementia related dysphagia  -supportive care     HTN  -goal BP <150/90, controlled  -cont lisinopril  -metoprolol dosing adjusted as above  -low sodium diet    Depression  -STABLE, uncomplicated  -continue fluoxetine   -continue valproic acid, level subtherapeutic, outpatient dose titration    Aortic Aneurysm   -STABLE, chronic  -known to family; daughter states most recently she was informed it was 6-7 cm  -CXR with aortic knob aneurysm 6.5 cm  -daughter states family member is a cardiothoracic surgeon and recommended no aggressive measures  -continue ASA daily    Stool impaction   -STABLE  -incidental finding on CT  -sp suppository  -cont stool softeners w/ PRN laxative     Hard of hearing  -chronic, uncomplicated  -daughter requested ENT evaluation and informed this can be done as outpatient, agreeable to plan    Normocytic Anemia: STABLE. asymptomatic. baseline Hgb 13 from earlier this year. B12 + Folate WNL. Iron studies largely unremarkable except for mildly elevated ferritin, ? AOCD. no signs acute/active bleeding @ this time, hemodynamically stable.   -full tay to be completed as outpatient    DVT prophylaxis - SCDs + Lovenox     GOC: Patient is DNR/DNI (MOLST form to be brought in by daughter Ritika who confirms patient's code status).

## 2018-09-30 NOTE — CDI QUERY NOTE - NSCDIOTHERTXTBX3_GEN_ALL_CORE_FT
This pt has a current UTI and was described by her daughter as "not her usual self the past few days"  Documentation states "behavioral disturbance".  A. metabolic encephalopathy 2/2 UTI  B.  encephalopathy 2/2 UTI and dementia with behavioral disturbance  C. Not clinically significant  D. Other

## 2018-09-30 NOTE — PROGRESS NOTE ADULT - SUBJECTIVE AND OBJECTIVE BOX
PMD: unknown    Patient seen and examined at the bedside. No acute overnight events. No events yesterday. Complaining of nothing this AM. Poor historian, does not answer questions appropriately but does not endorse having fever/chills, headache, lightheadedness, dizziness, chest pain, palpitations, shortness of breath, cough, abd pain, nausea/vomiting/diarrhea, muscle pain.      =========================================================================================    PHYSICAL EXAM.    GEN - appears younger than stated age. well nourished. pleasant. no distress.   HEENT - NCAT, EOMI, CHEVY  RESP - CTA BL, no wheeze/stridor/rhonchi/crackles. not on supplemental O2. able to speak in full sentences without distress.   CARDIO - NS1S2, RRR. No murmurs/rubs/gallops.  ABD - Soft/Non tender/Non distended. Normal BS x4 quadrants. no guarding/rebound tenderness.  Ext - No JAZMINE.  MSK - BL 4/5 strength on upper and lower extremities.   Neuro - AAOx0.   Psych - normal affect. responsive to questioning but does not answer questions appropriately  Skin - c/d/i. no rashes/lesions      VITAL SIGNS.    Vital Signs Last 24 Hrs  T(C): 36.4 (30 Sep 2018 08:27), Max: 36.7 (29 Sep 2018 15:25)  T(F): 97.6 (30 Sep 2018 08:27), Max: 98 (29 Sep 2018 15:25)  HR: 48 (30 Sep 2018 08:27) (43 - 66)  BP: 127/80 (30 Sep 2018 08:27) (119/76 - 146/90)  BP(mean): --  RR: 18 (30 Sep 2018 08:27) (18 - 18)  SpO2: 97% (30 Sep 2018 08:27) (95% - 97%)        =================================================    LABS.                          10.1   4.2   )-----------( 199      ( 30 Sep 2018 06:55 )             34.7     09-29    141  |  103  |  18.0  ----------------------------<  78  4.0   |  25.0  |  0.60    Ca    9.3      29 Sep 2018 06:54  Phos  3.6     09-29  Mg     2.0     09-29          I&O's Summary    29 Sep 2018 07:01  -  30 Sep 2018 07:00  --------------------------------------------------------  IN: 75 mL / OUT: 400 mL / NET: -325 mL        Culture - Urine (collected 27 Sep 2018 23:06)  Source: .Urine Clean Catch (Midstream)  Final Report (29 Sep 2018 17:08):    10,000 - 49,000 CFU/mL Escherichia coli  Organism: Escherichia coli (29 Sep 2018 17:08)  Organism: Escherichia coli (29 Sep 2018 17:08)        ================================================    IMAGING.      ================================================    HOME MEDS.    Home Medications:  allopurinol 100 mg oral tablet: 1 tab(s) orally once a day (28 Sep 2018 17:33)  aspirin 81 mg oral delayed release tablet: 1 tab(s) orally once a day (30 Mar 2018 12:20)  FLUoxetine 20 mg/5 mL oral solution: 10 milliliter(s) orally once a day (30 Mar 2018 12:20)  folic acid 1 mg oral tablet: 1 tab(s) orally once a day (27 Mar 2018 01:40)  lisinopril 2.5 mg oral tablet: 1 tab(s) orally once a day (30 Mar 2018 12:20)  metoprolol tartrate 50 mg oral tablet: 1 tab(s) orally 2 times a day (30 Mar 2018 12:20)  valproic acid 250 mg/5 mL oral syrup: 5 milliliter(s) orally 3 times a day (27 Mar 2018 05:11)      ================================================    HOSPITAL MEDS.    MEDICATIONS  (STANDING):  allopurinol 100 milliGRAM(s) Oral daily  aspirin  chewable 81 milliGRAM(s) Oral daily  cefTRIAXone   IVPB 1 Gram(s) IV Intermittent every 24 hours  docusate sodium 100 milliGRAM(s) Oral two times a day  enoxaparin Injectable 40 milliGRAM(s) SubCutaneous daily  FLUoxetine 10 milliGRAM(s) Oral daily  folic acid 1 milliGRAM(s) Oral daily  lisinopril 2.5 milliGRAM(s) Oral daily  metoprolol tartrate 50 milliGRAM(s) Oral two times a day  polyethylene glycol 3350 17 Gram(s) Oral daily  senna 2 Tablet(s) Oral at bedtime  valproic acid 250 milliGRAM(s) Oral three times a day    MEDICATIONS  (PRN):

## 2018-09-30 NOTE — CDI QUERY NOTE - NSCDIOTHERTXTBX2_GEN_ALL_CORE_FT
This pt is 89years old and fell at the [facility].  Please clarify:  A.  fall d/t age related frailty  B. fall 2/2 age related debility  C Not clinically significant  D. Other

## 2018-09-30 NOTE — CDI QUERY NOTE - NSCDIOTHERTXTBX_GEN_ALL_CORE_HH
Documentation states this pt fell and has a subdural hematoma.  Ct of head states no acute hemorrhage. Please clarify:  A.  Subdural hematoma d/t fall  B. No subdural hematoma  C.  Other

## 2018-10-01 ENCOUNTER — TRANSCRIPTION ENCOUNTER (OUTPATIENT)
Age: 83
End: 2018-10-01

## 2018-10-01 VITALS
OXYGEN SATURATION: 95 % | TEMPERATURE: 98 F | HEART RATE: 68 BPM | DIASTOLIC BLOOD PRESSURE: 97 MMHG | SYSTOLIC BLOOD PRESSURE: 146 MMHG

## 2018-10-01 LAB
T3 SERPL-MCNC: 66 NG/DL — LOW (ref 80–200)
T4 AB SER-ACNC: 7.1 UG/DL — SIGNIFICANT CHANGE UP (ref 4.5–12)
TSH SERPL-MCNC: 2.73 UIU/ML — SIGNIFICANT CHANGE UP (ref 0.27–4.2)

## 2018-10-01 PROCEDURE — 99239 HOSP IP/OBS DSCHRG MGMT >30: CPT

## 2018-10-01 RX ORDER — VALPROIC ACID (AS SODIUM SALT) 250 MG/5ML
1 SOLUTION, ORAL ORAL
Qty: 0 | Refills: 0 | COMMUNITY
Start: 2018-10-01

## 2018-10-01 RX ORDER — ALLOPURINOL 300 MG
1 TABLET ORAL
Qty: 0 | Refills: 0 | COMMUNITY

## 2018-10-01 RX ORDER — CALCIUM CARBONATE 500(1250)
1 TABLET ORAL
Qty: 0 | Refills: 0 | COMMUNITY
Start: 2018-10-01

## 2018-10-01 RX ORDER — METOPROLOL TARTRATE 50 MG
12.5 TABLET ORAL
Qty: 0 | Refills: 0 | Status: DISCONTINUED | OUTPATIENT
Start: 2018-10-01 | End: 2018-10-01

## 2018-10-01 RX ORDER — METOPROLOL TARTRATE 50 MG
12.5 TABLET ORAL
Qty: 0 | Refills: 0 | COMMUNITY
Start: 2018-10-01

## 2018-10-01 RX ORDER — DOCUSATE SODIUM 100 MG
1 CAPSULE ORAL
Qty: 0 | Refills: 0 | COMMUNITY
Start: 2018-10-01

## 2018-10-01 RX ORDER — FLUOXETINE HCL 10 MG
1 CAPSULE ORAL
Qty: 0 | Refills: 0 | COMMUNITY

## 2018-10-01 RX ORDER — CHOLECALCIFEROL (VITAMIN D3) 125 MCG
1000 CAPSULE ORAL
Qty: 0 | Refills: 0 | COMMUNITY
Start: 2018-10-01

## 2018-10-01 RX ORDER — VALPROIC ACID (AS SODIUM SALT) 250 MG/5ML
5 SOLUTION, ORAL ORAL
Qty: 0 | Refills: 0 | COMMUNITY

## 2018-10-01 RX ORDER — VALPROIC ACID (AS SODIUM SALT) 250 MG/5ML
250 SOLUTION, ORAL ORAL ONCE
Qty: 0 | Refills: 0 | Status: COMPLETED | OUTPATIENT
Start: 2018-10-01 | End: 2018-10-01

## 2018-10-01 RX ORDER — SENNA PLUS 8.6 MG/1
2 TABLET ORAL
Qty: 0 | Refills: 0 | COMMUNITY
Start: 2018-10-01

## 2018-10-01 RX ORDER — SACCHAROMYCES BOULARDII 250 MG
250 POWDER IN PACKET (EA) ORAL
Qty: 0 | Refills: 0 | Status: DISCONTINUED | OUTPATIENT
Start: 2018-10-01 | End: 2018-10-01

## 2018-10-01 RX ADMIN — Medication 100 MILLIGRAM(S): at 13:12

## 2018-10-01 RX ADMIN — POLYETHYLENE GLYCOL 3350 17 GRAM(S): 17 POWDER, FOR SOLUTION ORAL at 13:15

## 2018-10-01 RX ADMIN — Medication 250 MILLIGRAM(S): at 16:08

## 2018-10-01 RX ADMIN — Medication 10 MILLIGRAM(S): at 13:12

## 2018-10-01 RX ADMIN — ENOXAPARIN SODIUM 40 MILLIGRAM(S): 100 INJECTION SUBCUTANEOUS at 13:12

## 2018-10-01 RX ADMIN — Medication 1 TABLET(S): at 05:28

## 2018-10-01 RX ADMIN — Medication 81 MILLIGRAM(S): at 13:12

## 2018-10-01 RX ADMIN — Medication 250 MILLIGRAM(S): at 05:29

## 2018-10-01 RX ADMIN — Medication 1000 UNIT(S): at 13:12

## 2018-10-01 RX ADMIN — Medication 1 MILLIGRAM(S): at 13:12

## 2018-10-01 RX ADMIN — Medication 100 MILLIGRAM(S): at 05:29

## 2018-10-01 RX ADMIN — LISINOPRIL 2.5 MILLIGRAM(S): 2.5 TABLET ORAL at 05:29

## 2018-10-01 NOTE — PROGRESS NOTE ADULT - ASSESSMENT
Patient is a 89 year old female with PMH of Dementia, Subdural Hematoma, UTIs, HTN, Depression, & Aortic Aneurysm (6-7 cm) who was sent to the ED from [facility] after sustaining a mechanical fall and subsequently found to have a UTI.    Mechanical Fall  -STABLE  -present prior to admit  -likely multifactorial, suspect baseline instability with worsening from acute infection  -CT head/neck unremarkable  -CT spine with chronic compression deformities, no acute pathology  -Fall precautions  -PT evaluation for placement pending, Family requesting patient return to [facility].     Sinus Bradycardia:  -IMPROVING  -incidental finding, asymptomatic  -TFTs unremarkable  -HR ranging in high 40s at its lowest prior to lowering of bb, likely related to bb  -dec bb dose again to 12.5mg bid  -cont to monitor  -echo if remaining persistent or if symptoms develop despite change in bb dosing  -monitor bp and adjust other meds for better control if needed in lieu of dec bb    Pansensitive EColi UTI w/o hematuria:   -RESOLVED. present on UA done on arrival. UC confirming dx.   -sp course of Rocephin    Toxic Metabolic Encephalopathy   -RESOLVED  -present on arrival  -secondary to UTI superimposed on underlying history of Dementia  -CT head negative for acute pathology  -per family patient is awake and interactive at baseline, both present assuming pt returning to her normal  -cont treatement of underlying infection    T12-L2 Vertebral Compression Deformities  -STABLE  -incidentally noted on imaging, asymptomatic  -likely related to poor bone denisty  -supportive care PRN, Ca + Vit D supplementation    Dysphagia: STABLE. sec to dementia. SLP evaluation appreciated  -pureed diet with thin liquid  -asp precautions, assist with feeds    Dementia  -STABLE  -w/o behavioral disturbances  -modified diet for dementia related dysphagia  -supportive care     HTN  -goal BP <150/90, controlled  -cont lisinopril  -metoprolol dosing adjusted as above  -low sodium diet    Depression  -STABLE, uncomplicated  -continue fluoxetine   -continue valproic acid, level subtherapeutic, outpatient dose titration    Aortic Aneurysm   -STABLE, chronic  -known to family; daughter states most recently she was informed it was 6-7 cm  -CXR with aortic knob aneurysm 6.5 cm  -daughter states family member is a cardiothoracic surgeon and recommended no aggressive measures  -continue ASA daily    Stool impaction   -STABLE  -incidental finding on CT  -sp suppository  -cont stool softeners w/ PRN laxative     Hard of hearing  -chronic, uncomplicated  -daughter requested ENT evaluation and informed this can be done as outpatient, agreeable to plan    Normocytic Anemia: STABLE. asymptomatic. baseline Hgb 13 from earlier this year. B12 + Folate WNL. Iron studies largely unremarkable except for mildly elevated ferritin, ? AOCD. no signs acute/active bleeding @ this time, hemodynamically stable.   -full tay to be completed as outpatient    DVT prophylaxis - SCDs + Lovenox     GOC: Patient is DNR/DNI (MOLST form to be brought in by daughter Ritika who confirms patient's code status). Patient is a 89 year old female with PMH of Dementia, Subdural Hematoma, UTIs, HTN, Depression, & Aortic Aneurysm (6-7 cm) who was sent to the ED from [facility] after sustaining a mechanical fall and subsequently found to have a UTI.    Mechanical Fall  -STABLE  -present prior to admit  -likely multifactorial, suspect baseline instability with worsening from acute infection  -CT head/neck unremarkable  -CT spine with chronic compression deformities, no acute pathology  -Fall precautions  -PT evaluation for placement pending, Family requesting patient return to [place].     Sinus Bradycardia:  -IMPROVING  -incidental finding, asymptomatic  -TFTs withy low T3 but normal TSH + T4, no indication for synthroid  -HR ranging in high 40s at its lowest prior to lowering of bb, likely related to bb  -dec bb dose again to 12.5mg bid  -cont to monitor  -echo if remaining persistent or if symptoms develop despite change in bb dosing  -monitor bp and adjust other meds for better control if needed in lieu of dec bb    Pansensitive EColi UTI w/o hematuria:   -RESOLVED. present on UA done on arrival. UC confirming dx.   -sp course of Rocephin    Toxic Metabolic Encephalopathy   -RESOLVED  -present on arrival  -secondary to UTI superimposed on underlying history of Dementia  -CT head negative for acute pathology  -per family patient is awake and interactive at baseline, both present assuming pt returning to her normal  -cont treatement of underlying infection    T12-L2 Vertebral Compression Deformities  -STABLE  -incidentally noted on imaging, asymptomatic  -likely related to poor bone denisty  -supportive care PRN, Ca + Vit D supplementation    Dysphagia: STABLE. sec to dementia. SLP evaluation appreciated  -pureed diet with thin liquid  -asp precautions, assist with feeds    Dementia  -STABLE  -w/o behavioral disturbances  -modified diet for dementia related dysphagia  -supportive care     HTN  -goal BP <150/90, controlled  -cont lisinopril  -metoprolol dosing adjusted as above  -low sodium diet    Depression  -STABLE, uncomplicated  -continue fluoxetine   -continue valproic acid, level subtherapeutic, outpatient dose titration    Aortic Aneurysm   -STABLE, chronic  -known to family; daughter states most recently she was informed it was 6-7 cm  -CXR with aortic knob aneurysm 6.5 cm  -daughter states family member is a cardiothoracic surgeon and recommended no aggressive measures  -continue ASA daily    Stool impaction   -STABLE  -incidental finding on CT  -sp suppository  -cont stool softeners w/ PRN laxative     Hard of hearing  -chronic, uncomplicated  -daughter requested ENT evaluation and informed this can be done as outpatient, agreeable to plan    Normocytic Anemia: STABLE. asymptomatic. baseline Hgb 13 from earlier this year. B12 + Folate WNL. Iron studies largely unremarkable except for mildly elevated ferritin, ? AOCD. no signs acute/active bleeding @ this time, hemodynamically stable.   -full tay to be completed as outpatient    DVT prophylaxis - SCDs + Lovenox     GOC: Patient is DNR/DNI (MOLST form to be brought in by daughter Ritika who confirms patient's code status).

## 2018-10-01 NOTE — DISCHARGE NOTE ADULT - HOSPITAL COURSE
Patient is a 89 year old female with PMH of Dementia, Subdural Hematoma, UTIs, HTN, Depression, & Aortic Aneurysm (6-7 cm) who was sent to the ED from [facility] after sustaining a mechanical fall and subsequently found to have a UTI. Mechanical Fall is likely multifactorial, suspect baseline instability with worsening from acute infection. CT head/neck unremarkable. CT spine with chronic compression deformities, no acute pathology. BB was decreased due to bradycardia. Pansensitive EColi UTI w/o hematuria, present on UA done on arrival. Completed course of Rocephin .Toxic Metabolic Encephalopathy, secondary to UTI superimposed on underlying history of Dementia.-CT head negative for acute pathology. Now resolved, as per family patient is awake and interactive at baseline. Seen by PT, recommend ELVIRA. Patient is DNR/DNI.

## 2018-10-01 NOTE — PROGRESS NOTE ADULT - ATTENDING COMMENTS
Patient seen and examined at the bedside. Agree with the above history, physical, assessment, and plan with the necessary amendments/elaborations below:    clinically stable. resting comfortably with no complaints. sp completed tx of UTI. no further indication for additional abx. No longer requires acute in hospital level of care. Can be continually followed/managed as an outpatient. Discussed details of admisison and plan at length with daughter Ritika via telephone, agreeable to plan Patient seen and examined @ the bedside today. Patient clinically stable at this time. No longer requires acute in hospital level of care. Can be continually followed/managed as an outpatient. Discussed at length with Daughter Ritika who is agreeable with plan.

## 2018-10-01 NOTE — DISCHARGE NOTE ADULT - CARE PLAN
Principal Discharge DX:	UTI (urinary tract infection)  Goal:	Resolved  Assessment and plan of treatment:	-RESOLVED. present on UA done on arrival. UC confirming dx.   -sp course of Rocephin  Secondary Diagnosis:	Fall  Assessment and plan of treatment:	-likely multifactorial, suspect baseline instability with worsening from acute infection  -CT head/neck unremarkable  -CT spine with chronic compression deformities, no acute pathology  -Fall precautions  -ELVIRA  Secondary Diagnosis:	Dementia  Assessment and plan of treatment:	-STABLE  -w/o behavioral disturbances  -modified diet for dementia related dysphagia  -supportive care  -ELVIRA  Secondary Diagnosis:	Toxic metabolic encephalopathy  Assessment and plan of treatment:	-RESOLVED  -present on arrival  -secondary to UTI superimposed on underlying history of Dementia  -CT head negative for acute pathology  -Patient back to baseline  -ELVIRA  Secondary Diagnosis:	Bradycardia  Assessment and plan of treatment:	-IMPROVED  -incidental finding, asymptomatic  -TFTs withy low T3 but normal TSH + T4, no indication for synthroid  -HR ranging in high 40s at its lowest prior to lowering of bb, likely related to bb  -dec bb dose  to 12.5mg bid

## 2018-10-01 NOTE — DISCHARGE NOTE ADULT - PLAN OF CARE
Resolved -RESOLVED. present on UA done on arrival. UC confirming dx.   -sp course of Rocephin -likely multifactorial, suspect baseline instability with worsening from acute infection  -CT head/neck unremarkable  -CT spine with chronic compression deformities, no acute pathology  -Fall precautions  -ELVIRA -STABLE  -w/o behavioral disturbances  -modified diet for dementia related dysphagia  -supportive care  -ELVIRA -RESOLVED  -present on arrival  -secondary to UTI superimposed on underlying history of Dementia  -CT head negative for acute pathology  -Patient back to baseline  -ELVIRA -IMPROVED  -incidental finding, asymptomatic  -TFTs withy low T3 but normal TSH + T4, no indication for synthroid  -HR ranging in high 40s at its lowest prior to lowering of bb, likely related to bb  -dec bb dose  to 12.5mg bid

## 2018-10-01 NOTE — DISCHARGE NOTE ADULT - CARE PROVIDER_API CALL
Carlos Eduardo Martinez (MD), Cardiovascular Disease; Critical Care Medicine; Internal Medicine  [address] [place] [address]  Phone: [phone]  Fax: [phone]

## 2018-10-01 NOTE — DISCHARGE NOTE ADULT - MEDICATION SUMMARY - MEDICATIONS TO TAKE
I will START or STAY ON the medications listed below when I get home from the hospital:    aspirin 81 mg oral delayed release tablet  -- 1 tab(s) by mouth once a day  -- Indication: For Heart health    lisinopril 2.5 mg oral tablet  -- 1 tab(s) by mouth once a day  -- Indication: For HTN    calcium carbonate 1250 mg (500 mg elemental calcium) oral tablet  -- 1 tab(s) by mouth 2 times a day  -- Indication: For Supplement    valproic acid 250 mg oral capsule  -- 1 cap(s) by mouth 3 times a day  -- Indication: For Mood    FLUoxetine 10 mg oral capsule  -- 1 cap(s) by mouth once a day  -- Indication: For Depression    allopurinol 100 mg oral tablet  -- 1 tab(s) by mouth once a day  -- Indication: For Gout    metoprolol  -- 12.5 milligram(s) by mouth every 12 hours  -- Indication: For HTN    senna oral tablet  -- 2 tab(s) by mouth once a day (at bedtime)  -- Indication: For Constipation    docusate sodium 100 mg oral capsule  -- 1 cap(s) by mouth 2 times a day  -- Indication: For Constipation    folic acid 1 mg oral tablet  -- 1 tab(s) by mouth once a day  -- Indication: For Supplement    cholecalciferol oral tablet  -- 1000 unit(s) by mouth once a day  -- Indication: For Supplement

## 2018-10-01 NOTE — PROGRESS NOTE ADULT - SUBJECTIVE AND OBJECTIVE BOX
PMD: unknown    Patient seen and examined at the bedside. No acute overnight events. No events yesterday. Complaining of nothing this AM. Poor historian, does not answer questions appropriately but does not endorse having fever/chills, headache, lightheadedness, dizziness, chest pain, palpitations, shortness of breath, cough, abd pain, nausea/vomiting/diarrhea, muscle pain.      =========================================================================================    PHYSICAL EXAM.    GEN - appears younger than stated age. well nourished. pleasant. no distress.   HEENT - NCAT, EOMI, CHEVY  RESP - CTA BL, no wheeze/stridor/rhonchi/crackles. not on supplemental O2. able to speak in full sentences without distress.   CARDIO - NS1S2, RRR. No murmurs/rubs/gallops.  ABD - Soft/Non tender/Non distended. Normal BS x4 quadrants. no guarding/rebound tenderness.  Ext - No JAZMINE.  MSK - BL 4/5 strength on upper and lower extremities.   Neuro - AAOx0.   Psych - normal affect. responsive to questioning but does not answer questions appropriately  Skin - c/d/i. no rashes/lesions      VITAL SIGNS.    Vital Signs Last 24 Hrs  T(C): 36.3 (01 Oct 2018 07:49), Max: 36.9 (01 Oct 2018 00:00)  T(F): 97.3 (01 Oct 2018 07:49), Max: 98.5 (01 Oct 2018 00:00)  HR: 58 (01 Oct 2018 07:49) (44 - 58)  BP: 144/92 (01 Oct 2018 07:49) (122/78 - 158/108)  BP(mean): 68 (30 Sep 2018 18:15) (68 - 68)  RR: 20 (01 Oct 2018 07:49) (18 - 20)  SpO2: 96% (01 Oct 2018 07:49) (96% - 97%)        =================================================    LABS.                          10.1   4.2   )-----------( 199      ( 30 Sep 2018 06:55 )             34.7               I&O's Summary    30 Sep 2018 07:01  -  01 Oct 2018 07:00  --------------------------------------------------------  IN: 0 mL / OUT: 400 mL / NET: -400 mL          ================================================    IMAGING.      ================================================    HOME MEDS.    Home Medications:  allopurinol 100 mg oral tablet: 1 tab(s) orally once a day (28 Sep 2018 17:33)  aspirin 81 mg oral delayed release tablet: 1 tab(s) orally once a day (30 Mar 2018 12:20)  FLUoxetine 20 mg/5 mL oral solution: 10 milliliter(s) orally once a day (30 Mar 2018 12:20)  folic acid 1 mg oral tablet: 1 tab(s) orally once a day (27 Mar 2018 01:40)  lisinopril 2.5 mg oral tablet: 1 tab(s) orally once a day (30 Mar 2018 12:20)  metoprolol tartrate 50 mg oral tablet: 1 tab(s) orally 2 times a day (30 Mar 2018 12:20)  valproic acid 250 mg/5 mL oral syrup: 5 milliliter(s) orally 3 times a day (27 Mar 2018 05:11)      ================================================    HOSPITAL MEDS.    MEDICATIONS  (STANDING):  allopurinol 100 milliGRAM(s) Oral daily  aspirin  chewable 81 milliGRAM(s) Oral daily  calcium carbonate   1250 mG (OsCal) 1 Tablet(s) Oral two times a day  cefTRIAXone   IVPB 1 Gram(s) IV Intermittent every 24 hours  cholecalciferol 1000 Unit(s) Oral daily  docusate sodium 100 milliGRAM(s) Oral two times a day  enoxaparin Injectable 40 milliGRAM(s) SubCutaneous daily  FLUoxetine 10 milliGRAM(s) Oral daily  folic acid 1 milliGRAM(s) Oral daily  lisinopril 2.5 milliGRAM(s) Oral daily  metoprolol tartrate 25 milliGRAM(s) Oral two times a day  polyethylene glycol 3350 17 Gram(s) Oral daily  saccharomyces boulardii 250 milliGRAM(s) Oral two times a day  senna 2 Tablet(s) Oral at bedtime  valproic acid 250 milliGRAM(s) Oral three times a day    MEDICATIONS  (PRN):

## 2018-11-11 PROCEDURE — 86900 BLOOD TYPING SEROLOGIC ABO: CPT

## 2018-11-11 PROCEDURE — 83735 ASSAY OF MAGNESIUM: CPT

## 2018-11-11 PROCEDURE — 82607 VITAMIN B-12: CPT

## 2018-11-11 PROCEDURE — 86870 RBC ANTIBODY IDENTIFICATION: CPT

## 2018-11-11 PROCEDURE — 86850 RBC ANTIBODY SCREEN: CPT

## 2018-11-11 PROCEDURE — 82746 ASSAY OF FOLIC ACID SERUM: CPT

## 2018-11-11 PROCEDURE — 36415 COLL VENOUS BLD VENIPUNCTURE: CPT

## 2018-11-11 PROCEDURE — 80164 ASSAY DIPROPYLACETIC ACD TOT: CPT

## 2018-11-11 PROCEDURE — 71045 X-RAY EXAM CHEST 1 VIEW: CPT

## 2018-11-11 PROCEDURE — 82728 ASSAY OF FERRITIN: CPT

## 2018-11-11 PROCEDURE — 99285 EMERGENCY DEPT VISIT HI MDM: CPT

## 2018-11-11 PROCEDURE — 84466 ASSAY OF TRANSFERRIN: CPT

## 2018-11-11 PROCEDURE — 72192 CT PELVIS W/O DYE: CPT

## 2018-11-11 PROCEDURE — 72125 CT NECK SPINE W/O DYE: CPT

## 2018-11-11 PROCEDURE — 84100 ASSAY OF PHOSPHORUS: CPT

## 2018-11-11 PROCEDURE — 81001 URINALYSIS AUTO W/SCOPE: CPT

## 2018-11-11 PROCEDURE — 84480 ASSAY TRIIODOTHYRONINE (T3): CPT

## 2018-11-11 PROCEDURE — 84443 ASSAY THYROID STIM HORMONE: CPT

## 2018-11-11 PROCEDURE — 86880 COOMBS TEST DIRECT: CPT

## 2018-11-11 PROCEDURE — 70450 CT HEAD/BRAIN W/O DYE: CPT

## 2018-11-11 PROCEDURE — 86901 BLOOD TYPING SEROLOGIC RH(D): CPT

## 2018-11-11 PROCEDURE — 92610 EVALUATE SWALLOWING FUNCTION: CPT

## 2018-11-11 PROCEDURE — 72131 CT LUMBAR SPINE W/O DYE: CPT

## 2018-11-11 PROCEDURE — 80048 BASIC METABOLIC PNL TOTAL CA: CPT

## 2018-11-11 PROCEDURE — 97163 PT EVAL HIGH COMPLEX 45 MIN: CPT

## 2018-11-11 PROCEDURE — 85027 COMPLETE CBC AUTOMATED: CPT

## 2018-11-11 PROCEDURE — 87186 SC STD MICRODIL/AGAR DIL: CPT

## 2018-11-11 PROCEDURE — 85730 THROMBOPLASTIN TIME PARTIAL: CPT

## 2018-11-11 PROCEDURE — 83550 IRON BINDING TEST: CPT

## 2018-11-11 PROCEDURE — 84484 ASSAY OF TROPONIN QUANT: CPT

## 2018-11-11 PROCEDURE — 85610 PROTHROMBIN TIME: CPT

## 2018-11-11 PROCEDURE — 93005 ELECTROCARDIOGRAM TRACING: CPT

## 2018-11-11 PROCEDURE — 84436 ASSAY OF TOTAL THYROXINE: CPT

## 2018-11-11 PROCEDURE — 87086 URINE CULTURE/COLONY COUNT: CPT

## 2018-11-11 PROCEDURE — 92526 ORAL FUNCTION THERAPY: CPT

## 2018-11-11 PROCEDURE — 80053 COMPREHEN METABOLIC PANEL: CPT

## 2019-01-28 ENCOUNTER — TRANSCRIPTION ENCOUNTER (OUTPATIENT)
Age: 84
End: 2019-01-28

## 2019-06-20 NOTE — ED PROVIDER NOTE - PMH
"  ENCOUNTER DATE: 6/10/19    SITE: [facility]    INSIGHT ORIENTED PSYCHOTHERAPY: [id number] 45-50 min    Met with patient .    ENCOUNTER REASON/CHIEF COMPLAINT(symptom, problem, diagnosis, follow-up, reason for encounter):   The patient presents for follow up for coping skills to better manage her anxiety, depression and stress. pt. struggles with self regulation of her emotional states.     INTERVAL EVENTS: Pt was last seen 2 weeks ago for session. Pt presents alone in session. Pt presents irritable and overwhelmed in the session. Pt reports she attributes he "bad state" to having too many responsibilities. Pt continues to review a current list of concerns. Pt continues to reports having a very involved role in her sister-in-laws care as she is being treated with experimental drugs to try to treat her cancer. Pt continues to express great sadness and feelings of guilt surrounding her sister-in-law. Clinician continues to address her misplaced guilt with most areas in her life. Pt continues to presents more hypo-manic in session as evidenced by her talking real fast and with pressured speech. Pt reports feeling like she has too many things going on at once with daily activities, social commitments, caring for her , sister-in-law, mother, and Kaelyn 15yo granddaughter. Clinician encouraged pt to write down a daily task list and not dwell on it rather check her list periodically throughout the day.  Pt also reports she continues to have regular contact with her friends and is getting to participate in Yoga, Pottery and having regular get together's with her friends.  Pt continues to report to be very involved in her granddaughter's activities with school. Pt reports the arrangement for caring for her mother seems to be working out between pt and her siblings.  Clinician continues to work with pt to reframe concerns in practical ways that can be better addressed to alleviate her concerns. Clinician continues to " work with pt on utilizing cbt skills to better manage and tolerate distress and improve interpersonal relationship skills.   Clinician continues to work with pt on self acceptance, being more realistic with her self and others and working on identifying her excessive guilt especially as it relates to her family e.g. Relationship with children.  Clinician continues to challenge pt to work on having more structure in her daily scheduled and to work on being mindful of how much negative thinking she is dwelling on rather engaging in positive activities & focus on the present. Additionally, clinician suggested to pt with  to consider taking a small vacation and taking a break from her stressors with the family. Pt's  was receptive to this decision.  Pt continues to report she has been compliant with medications since her last session.     Mood:less- anxious/ more depressed,  Affect:appropriate to circumstance, TP: circumstantial,tangential ,slight- rapid & pressured speech,  (+)racing thoughts, paranoid thinking, agitation, mood swings,(+) tearful episodes (+) worry,(+) obsessive & intrusive thoughts, ruminates on past events that tend to be negative with her adult children,Sleep: improved, No SI,insight/judgement: fair/improving.  Pt continues to present with automatic negative thoughts about her self continues to have insecurities, ego dystonic and continues to express extreme guilt over her lack of involvement with her mother.  Clinician continues to challenge these negative thoughts, focus on challenge statements, worked on mindfulness become of aware of her distortions that contribute to her feelings anxious and depressed. Pt continues to discuss stress mgmt coping skills, family dynamics. Pt continues to struggle with interpersonal relationships and family dynamics.      TREATMENT PLAN:     No changes in treatment plan from chart note of (date): 1/26/2012    Target Symptoms: Mood swings,depression,  anxiety, irritability,anergiam anhedonia, affective lability, poor interpersonal relationships, co dependent bx's, guilt and racing thoughts, no motivation, frequent tearful episodes, cognitive distortions, paranoid & hopeless.    Therapeutic Intervention type:   Support  Behavior Modification  Medication Mgmt  Why chosen therapy is appropriate versus another modality:  Relevant to diagnosis:evidenced based.  Patient responds to this modality  Outcome monitoring methods:  Self-Report  Observation    RISK PARAMETERS:     Patient reports no suicidal ideation.  Patient reports no homicidal ideation.  Patient reports no self injurious behavior.  Patient reports no violent behavior.    PATIENT'S RESPONSE TO INTERVENTION:   The patient's response to intervention is accepting.    PROGRESS TOWARD GOALS AND OTHER MENTAL STATUS CHANGES:   The patient's progress toward goals is poor.    DIAGNOSIS  Bipolar DO, most recent manic  (296.52)  HUMPHREY (300.02)  Personality NOS (301.89)  Family Circumstance NOS/Caring for an aging parent with dementia (V61.3)    GLOBAL ASSESSMENT OF FUNCTION SCALE:  The patient's GAF is 70    PLAN:   Pt. should start to attend individual therapy once a week.   Medication Management: Pt is currently being followed by Dr. Mack.   See physician notes.  Return to clinic in 2-4 weeks.             Aneurysm    Dementia    Depression    HTN (hypertension)

## 2020-02-10 NOTE — PATIENT PROFILE ADULT. - NS PRO ABUSE SCREEN AFRAID ANYONE YN
----- Message from Glenny Crooks sent at 2/10/2020 12:10 PM CST -----  Contact: Pt  Type: Needs Medical Advice    Who Called: Pt   Best Call Back Number:[phone]  Additional Information: Pt is calling to know the time of colonoscopy that's scheduled on 2/11//20. Please call pt [phone] and advise.    
ASC to call this afternoon  
unable to assess

## 2021-06-01 NOTE — DISCHARGE NOTE ADULT - NS AS DC PROVIDER CONTACT Y/N MULTI
well developed, well nourished , in no acute distress , ambulating without difficulty, normal communication ability
Yes